# Patient Record
Sex: MALE | Race: WHITE | NOT HISPANIC OR LATINO | URBAN - METROPOLITAN AREA
[De-identification: names, ages, dates, MRNs, and addresses within clinical notes are randomized per-mention and may not be internally consistent; named-entity substitution may affect disease eponyms.]

---

## 2017-11-21 ENCOUNTER — EMERGENCY (EMERGENCY)
Facility: HOSPITAL | Age: 82
LOS: 0 days | Discharge: HOME | End: 2017-11-21
Admitting: INTERNAL MEDICINE

## 2017-11-21 DIAGNOSIS — E78.5 HYPERLIPIDEMIA, UNSPECIFIED: ICD-10-CM

## 2017-11-21 DIAGNOSIS — Z79.01 LONG TERM (CURRENT) USE OF ANTICOAGULANTS: ICD-10-CM

## 2017-11-21 DIAGNOSIS — Z79.899 OTHER LONG TERM (CURRENT) DRUG THERAPY: ICD-10-CM

## 2017-11-21 DIAGNOSIS — I25.10 ATHEROSCLEROTIC HEART DISEASE OF NATIVE CORONARY ARTERY WITHOUT ANGINA PECTORIS: ICD-10-CM

## 2017-11-21 DIAGNOSIS — I12.9 HYPERTENSIVE CHRONIC KIDNEY DISEASE WITH STAGE 1 THROUGH STAGE 4 CHRONIC KIDNEY DISEASE, OR UNSPECIFIED CHRONIC KIDNEY DISEASE: ICD-10-CM

## 2017-11-21 DIAGNOSIS — R79.1 ABNORMAL COAGULATION PROFILE: ICD-10-CM

## 2017-11-21 DIAGNOSIS — I71.4 ABDOMINAL AORTIC ANEURYSM, WITHOUT RUPTURE: ICD-10-CM

## 2017-11-21 DIAGNOSIS — I48.91 UNSPECIFIED ATRIAL FIBRILLATION: ICD-10-CM

## 2017-11-21 DIAGNOSIS — E11.9 TYPE 2 DIABETES MELLITUS WITHOUT COMPLICATIONS: ICD-10-CM

## 2017-11-21 DIAGNOSIS — N18.9 CHRONIC KIDNEY DISEASE, UNSPECIFIED: ICD-10-CM

## 2021-02-08 PROBLEM — Z00.00 ENCOUNTER FOR PREVENTIVE HEALTH EXAMINATION: Status: ACTIVE | Noted: 2021-02-08

## 2021-02-12 ENCOUNTER — NON-APPOINTMENT (OUTPATIENT)
Age: 86
End: 2021-02-12

## 2021-02-12 ENCOUNTER — APPOINTMENT (OUTPATIENT)
Dept: VASCULAR SURGERY | Facility: CLINIC | Age: 86
End: 2021-02-12
Payer: MEDICARE

## 2021-02-12 VITALS
TEMPERATURE: 96.9 F | BODY MASS INDEX: 21.66 KG/M2 | HEART RATE: 84 BPM | DIASTOLIC BLOOD PRESSURE: 69 MMHG | WEIGHT: 138 LBS | HEIGHT: 67 IN | SYSTOLIC BLOOD PRESSURE: 115 MMHG

## 2021-02-12 DIAGNOSIS — Z85.038 PERSONAL HISTORY OF OTHER MALIGNANT NEOPLASM OF LARGE INTESTINE: ICD-10-CM

## 2021-02-12 DIAGNOSIS — Z87.891 PERSONAL HISTORY OF NICOTINE DEPENDENCE: ICD-10-CM

## 2021-02-12 PROCEDURE — 99203 OFFICE O/P NEW LOW 30 MIN: CPT

## 2021-02-12 PROCEDURE — 93978 VASCULAR STUDY: CPT

## 2021-02-12 NOTE — DATA REVIEWED
[FreeTextEntry1] : I performed an arterial duplex which was medically necessary to evaluate for AAA. It showed AAA measuring 4.2 cm.\par

## 2021-02-12 NOTE — HISTORY OF PRESENT ILLNESS
[FreeTextEntry1] : 84 y/o gentleman with h/o LUE DVT in 2011, on Coumadin since then, also has h/o atrial fibrillation. Has h/o AAA that measured 3.5 cm on CT scan in 2017.

## 2021-02-12 NOTE — ASSESSMENT
[FreeTextEntry1] : 86 y/o gentleman with h/o LUE DVT in 2011, on Coumadin since then, also has h/o atrial fibrillation. Has h/o AAA that measured 3.5 cm on CT scan in 2017. \par \par Arterial duplex today showed AAA measuring 4.2 cm. I also reviewed the CT scan from 2017.\par \par I have informed him of the test results and advised follow up in six months time for repeat aortic duplex and lower extremity arterial duplex to evaluate for popliteal artery aneurysms. I have also informed him that he does not require anticoagulation from a vascular perspective for h/o LUE DVT, and should followup with Cardiology as he may be on anticoagulation for atrial fibrillation.

## 2021-03-16 ENCOUNTER — OUTPATIENT (OUTPATIENT)
Dept: OUTPATIENT SERVICES | Facility: HOSPITAL | Age: 86
LOS: 1 days | Discharge: HOME | End: 2021-03-16
Payer: MEDICARE

## 2021-03-16 DIAGNOSIS — R10.9 UNSPECIFIED ABDOMINAL PAIN: ICD-10-CM

## 2021-03-16 PROCEDURE — 76700 US EXAM ABDOM COMPLETE: CPT | Mod: 26

## 2021-04-22 ENCOUNTER — OUTPATIENT (OUTPATIENT)
Dept: OUTPATIENT SERVICES | Facility: HOSPITAL | Age: 86
LOS: 1 days | Discharge: HOME | End: 2021-04-22
Payer: MEDICARE

## 2021-04-22 DIAGNOSIS — D68.9 COAGULATION DEFECT, UNSPECIFIED: ICD-10-CM

## 2021-04-22 PROCEDURE — 74182 MRI ABDOMEN W/CONTRAST: CPT | Mod: 26,MH

## 2021-05-04 ENCOUNTER — EMERGENCY (EMERGENCY)
Facility: HOSPITAL | Age: 86
LOS: 0 days | Discharge: HOME | End: 2021-05-04
Attending: EMERGENCY MEDICINE | Admitting: EMERGENCY MEDICINE
Payer: MEDICARE

## 2021-05-04 VITALS
SYSTOLIC BLOOD PRESSURE: 123 MMHG | OXYGEN SATURATION: 96 % | WEIGHT: 138.01 LBS | HEART RATE: 82 BPM | DIASTOLIC BLOOD PRESSURE: 84 MMHG | TEMPERATURE: 98 F | RESPIRATION RATE: 18 BRPM

## 2021-05-04 VITALS
SYSTOLIC BLOOD PRESSURE: 122 MMHG | OXYGEN SATURATION: 98 % | RESPIRATION RATE: 18 BRPM | DIASTOLIC BLOOD PRESSURE: 59 MMHG | HEART RATE: 78 BPM

## 2021-05-04 DIAGNOSIS — Z90.49 ACQUIRED ABSENCE OF OTHER SPECIFIED PARTS OF DIGESTIVE TRACT: Chronic | ICD-10-CM

## 2021-05-04 DIAGNOSIS — Z95.5 PRESENCE OF CORONARY ANGIOPLASTY IMPLANT AND GRAFT: ICD-10-CM

## 2021-05-04 DIAGNOSIS — E78.5 HYPERLIPIDEMIA, UNSPECIFIED: ICD-10-CM

## 2021-05-04 DIAGNOSIS — Z95.5 PRESENCE OF CORONARY ANGIOPLASTY IMPLANT AND GRAFT: Chronic | ICD-10-CM

## 2021-05-04 DIAGNOSIS — Z87.891 PERSONAL HISTORY OF NICOTINE DEPENDENCE: ICD-10-CM

## 2021-05-04 DIAGNOSIS — Z90.49 ACQUIRED ABSENCE OF OTHER SPECIFIED PARTS OF DIGESTIVE TRACT: ICD-10-CM

## 2021-05-04 DIAGNOSIS — I25.10 ATHEROSCLEROTIC HEART DISEASE OF NATIVE CORONARY ARTERY WITHOUT ANGINA PECTORIS: ICD-10-CM

## 2021-05-04 DIAGNOSIS — R42 DIZZINESS AND GIDDINESS: ICD-10-CM

## 2021-05-04 DIAGNOSIS — R55 SYNCOPE AND COLLAPSE: ICD-10-CM

## 2021-05-04 DIAGNOSIS — I10 ESSENTIAL (PRIMARY) HYPERTENSION: ICD-10-CM

## 2021-05-04 LAB
ALBUMIN SERPL ELPH-MCNC: 4.3 G/DL — SIGNIFICANT CHANGE UP (ref 3.5–5.2)
ALP SERPL-CCNC: 88 U/L — SIGNIFICANT CHANGE UP (ref 30–115)
ALT FLD-CCNC: 9 U/L — SIGNIFICANT CHANGE UP (ref 0–41)
ANION GAP SERPL CALC-SCNC: 11 MMOL/L — SIGNIFICANT CHANGE UP (ref 7–14)
APTT BLD: 23.5 SEC — CRITICAL LOW (ref 27–39.2)
AST SERPL-CCNC: 17 U/L — SIGNIFICANT CHANGE UP (ref 0–41)
BASOPHILS # BLD AUTO: 0.02 K/UL — SIGNIFICANT CHANGE UP (ref 0–0.2)
BASOPHILS NFR BLD AUTO: 0.2 % — SIGNIFICANT CHANGE UP (ref 0–1)
BILIRUB SERPL-MCNC: 0.3 MG/DL — SIGNIFICANT CHANGE UP (ref 0.2–1.2)
BUN SERPL-MCNC: 33 MG/DL — HIGH (ref 10–20)
CALCIUM SERPL-MCNC: 9.5 MG/DL — SIGNIFICANT CHANGE UP (ref 8.5–10.1)
CHLORIDE SERPL-SCNC: 107 MMOL/L — SIGNIFICANT CHANGE UP (ref 98–110)
CO2 SERPL-SCNC: 23 MMOL/L — SIGNIFICANT CHANGE UP (ref 17–32)
CREAT SERPL-MCNC: 2.1 MG/DL — HIGH (ref 0.7–1.5)
EOSINOPHIL # BLD AUTO: 0.02 K/UL — SIGNIFICANT CHANGE UP (ref 0–0.7)
EOSINOPHIL NFR BLD AUTO: 0.2 % — SIGNIFICANT CHANGE UP (ref 0–8)
GLUCOSE SERPL-MCNC: 112 MG/DL — HIGH (ref 70–99)
HCT VFR BLD CALC: 28.9 % — LOW (ref 42–52)
HGB BLD-MCNC: 8.9 G/DL — LOW (ref 14–18)
IMM GRANULOCYTES NFR BLD AUTO: 0.6 % — HIGH (ref 0.1–0.3)
INR BLD: 1.46 RATIO — HIGH (ref 0.65–1.3)
LYMPHOCYTES # BLD AUTO: 0.81 K/UL — LOW (ref 1.2–3.4)
LYMPHOCYTES # BLD AUTO: 9.6 % — LOW (ref 20.5–51.1)
MAGNESIUM SERPL-MCNC: 2.1 MG/DL — SIGNIFICANT CHANGE UP (ref 1.8–2.4)
MCHC RBC-ENTMCNC: 26.2 PG — LOW (ref 27–31)
MCHC RBC-ENTMCNC: 30.8 G/DL — LOW (ref 32–37)
MCV RBC AUTO: 85 FL — SIGNIFICANT CHANGE UP (ref 80–94)
MONOCYTES # BLD AUTO: 0.35 K/UL — SIGNIFICANT CHANGE UP (ref 0.1–0.6)
MONOCYTES NFR BLD AUTO: 4.2 % — SIGNIFICANT CHANGE UP (ref 1.7–9.3)
NEUTROPHILS # BLD AUTO: 7.17 K/UL — HIGH (ref 1.4–6.5)
NEUTROPHILS NFR BLD AUTO: 85.2 % — HIGH (ref 42.2–75.2)
NRBC # BLD: 0 /100 WBCS — SIGNIFICANT CHANGE UP (ref 0–0)
NT-PROBNP SERPL-SCNC: 1098 PG/ML — HIGH (ref 0–300)
PLATELET # BLD AUTO: 195 K/UL — SIGNIFICANT CHANGE UP (ref 130–400)
POTASSIUM SERPL-MCNC: 4.7 MMOL/L — SIGNIFICANT CHANGE UP (ref 3.5–5)
POTASSIUM SERPL-SCNC: 4.7 MMOL/L — SIGNIFICANT CHANGE UP (ref 3.5–5)
PROT SERPL-MCNC: 7.3 G/DL — SIGNIFICANT CHANGE UP (ref 6–8)
PROTHROM AB SERPL-ACNC: 16.8 SEC — HIGH (ref 9.95–12.87)
RBC # BLD: 3.4 M/UL — LOW (ref 4.7–6.1)
RBC # FLD: 15.3 % — HIGH (ref 11.5–14.5)
SODIUM SERPL-SCNC: 141 MMOL/L — SIGNIFICANT CHANGE UP (ref 135–146)
TROPONIN T SERPL-MCNC: <0.01 NG/ML — SIGNIFICANT CHANGE UP
WBC # BLD: 8.42 K/UL — SIGNIFICANT CHANGE UP (ref 4.8–10.8)
WBC # FLD AUTO: 8.42 K/UL — SIGNIFICANT CHANGE UP (ref 4.8–10.8)

## 2021-05-04 PROCEDURE — 99285 EMERGENCY DEPT VISIT HI MDM: CPT

## 2021-05-04 PROCEDURE — 71046 X-RAY EXAM CHEST 2 VIEWS: CPT | Mod: 26

## 2021-05-04 RX ORDER — SODIUM CHLORIDE 9 MG/ML
1000 INJECTION, SOLUTION INTRAVENOUS ONCE
Refills: 0 | Status: COMPLETED | OUTPATIENT
Start: 2021-05-04 | End: 2021-05-04

## 2021-05-04 RX ADMIN — SODIUM CHLORIDE 1000 MILLILITER(S): 9 INJECTION, SOLUTION INTRAVENOUS at 16:17

## 2021-05-04 NOTE — ED ADULT TRIAGE NOTE - CHIEF COMPLAINT QUOTE
near syncope pt states he was shopping with wife and got dizzy, denies LOC, denies falling, states sxs resolved now

## 2021-05-04 NOTE — ED PROVIDER NOTE - ATTENDING CONTRIBUTION TO CARE
84 yo M PMHx noted presents with wife after feeling like he might pass out while shopping with his wife. pt states he felt lightheaded.  no CP, no SOB, no n/v.  Family states that pt does not drink a lot of fluids.  Pt feeling well at this time, On exam pt in NAD AAO x 3, speech is clear and fluent, face symmetrical, Op clear, MMM, Lungs cta b/l, no wrr, abd soft nt nd, no edema, good tone, equal strength, no drift,

## 2021-05-04 NOTE — ED PROVIDER NOTE - OBJECTIVE STATEMENT
86 yo M with PMHx of CAD s/p PCI, HLD, HTN, kidney stones s/p ureteral stents who was BIBEMS for near syncopal episode that occurred prior to arrival. Pt was in his usual state of health and was shopping when he felt dizzy. His wife caught him before he fell. No preceding cp, sob, palpitations, diaphoresis, nasuea prior to fall. No shaking of extremities, tongue biting, incontinence, postictal period. Pt complained of cp yesterday but none today. No fever, chills, cough, sob, nausea, vomiting, abd pain. Pt does not know why he takes coumadin.    Cardio: Dr. Navarro  Nephro: Dr. Josue 84 yo M with PMHx of CAD s/p PCI, HLD, HTN, ureteral stents who was BIBEMS for near syncopal episode that occurred prior to arrival. Pt was in his usual state of health and was shopping when he felt dizzy. His wife caught him before he fell. No preceding cp, sob, palpitations, diaphoresis, nasuea prior to fall. No shaking of extremities, tongue biting, incontinence, postictal period. Pt complained of cp yesterday but none today. No fever, chills, cough, sob, nausea, vomiting, abd pain. Pt does not know why he takes coumadin.    Cardio: Dr. Navarro  Nephro: Dr. Josue

## 2021-05-04 NOTE — ED PROVIDER NOTE - PATIENT PORTAL LINK FT
You can access the FollowMyHealth Patient Portal offered by [facility] by registering at the following website: [email or web address]. By joining [employer]’s FollowMyHealth portal, you will also be able to view your health information using other applications (apps) compatible with our system.

## 2021-05-04 NOTE — ED ADULT NURSE NOTE - CHIEF COMPLAINT QUOTE
pt states he was shopping with wife and got dizzy, denies LOC, denies falling, states sxs resolved now

## 2021-05-04 NOTE — ED PROVIDER NOTE - CARE PROVIDER_API CALL
Popeye Navarro E  Cardiology  [address] [place] [address]  Phone: [phone]  Fax: [phone]  Follow Up Time:

## 2021-05-04 NOTE — ED PROVIDER NOTE - CLINICAL SUMMARY MEDICAL DECISION MAKING FREE TEXT BOX
Pt observed on cardiac monitor.  no ectopy/arrythmia.  Results reviewed and d/w pt.  Hgb noted.  Pt denies any bleeding,  Pt prefers to go home.  Has good follow up and is reliable. Strict return precautions discussed. Pt instructed to return if any worsening symptoms or concerns.  They verbalize understanding.

## 2021-05-04 NOTE — ED PROVIDER NOTE - PROGRESS NOTE DETAILS
TC: 84 yo M with PMHx of CAD s/p PCI, HLD, HTN, kidney stones s/p ureteral stents who presents with near syncopal event. No preceding cardiac sx. No seizure like activity. Currently feels well. Here in ED, vitals wnl. Ordered labs, ekg, cxr. Given IVF. Will reassess. TC: Reassessed pt, states he feels well. Labs notable for hgb 8.9 (unknown baseline, last hgb 12.3 on 11/21/17 but had labs done 2 wks ago), BUN/Cr at baseline. Ekg unremarkable. Cxr negative. Instructed to f/u with cardio. Strict ED return precautions given. Pt and family verbalized understanding and were agreeable with plan. TC: 84 yo M with PMHx of CAD s/p PCI, HLD, HTN, ?ureteral stents who presents with near syncopal event. No preceding cardiac sx. No seizure like activity. Currently feels well. Here in ED, vitals wnl. Ordered labs, ekg, cxr. Given IVF. Will reassess.

## 2021-05-04 NOTE — ED ADULT TRIAGE NOTE - NSWEIGHTCALCTOOLDRUG_GEN_A_CORE
Quality 431: Preventive Care And Screening: Unhealthy Alcohol Use - Screening: Patient screened for unhealthy alcohol use using a single question and scores less than 2 times per year
Quality 130: Documentation Of Current Medications In The Medical Record: Current Medications Documented
Quality 226: Preventive Care And Screening: Tobacco Use: Screening And Cessation Intervention: Patient screened for tobacco use and is an ex/non-smoker
Detail Level: Detailed
Quality 110: Preventive Care And Screening: Influenza Immunization: Influenza immunization was not ordered or administered, reason not given
 used

## 2021-05-04 NOTE — ED PROVIDER NOTE - PMH
Coronary artery disease    Hyperlipidemia    Hypertension    Kidney stone     Coronary artery disease    Hyperlipidemia    Hypertension

## 2021-05-04 NOTE — ED PROVIDER NOTE - NS ED ROS FT
GEN:  no fever, no chills, no generalized weakness  NEURO:  no headache, + dizziness  ENT: no sore throat, no runny nose  CV:  + chest pain yesterday, no palpitations  RESP:  no sob, no cough  GI:  no nausea, no vomiting, no abdominal pain, no diarrhea  :  no dysuria, no urinary frequency, no hematuria  MSK:  no joint pain, no edema  SKIN:  no rash, no bruising  HEME: no hematochezia, no melena

## 2021-07-27 PROBLEM — E78.5 HYPERLIPIDEMIA, UNSPECIFIED: Chronic | Status: ACTIVE | Noted: 2021-05-04

## 2021-07-27 PROBLEM — I25.10 ATHEROSCLEROTIC HEART DISEASE OF NATIVE CORONARY ARTERY WITHOUT ANGINA PECTORIS: Chronic | Status: ACTIVE | Noted: 2021-05-04

## 2021-07-27 PROBLEM — I10 ESSENTIAL (PRIMARY) HYPERTENSION: Chronic | Status: ACTIVE | Noted: 2021-05-04

## 2021-08-18 DIAGNOSIS — I72.4 ANEURYSM OF ARTERY OF LOWER EXTREMITY: ICD-10-CM

## 2021-08-20 ENCOUNTER — APPOINTMENT (OUTPATIENT)
Dept: HEMATOLOGY ONCOLOGY | Facility: CLINIC | Age: 86
End: 2021-08-20

## 2021-08-25 ENCOUNTER — APPOINTMENT (OUTPATIENT)
Dept: VASCULAR SURGERY | Facility: CLINIC | Age: 86
End: 2021-08-25

## 2021-12-08 ENCOUNTER — EMERGENCY (EMERGENCY)
Facility: HOSPITAL | Age: 86
LOS: 0 days | Discharge: HOME | End: 2021-12-08
Admitting: EMERGENCY MEDICINE

## 2021-12-08 ENCOUNTER — INPATIENT (INPATIENT)
Facility: HOSPITAL | Age: 86
LOS: 0 days | Discharge: HOME | End: 2021-12-09
Attending: HOSPITALIST | Admitting: HOSPITALIST
Payer: MEDICARE

## 2021-12-08 VITALS
DIASTOLIC BLOOD PRESSURE: 82 MMHG | RESPIRATION RATE: 20 BRPM | TEMPERATURE: 97 F | HEIGHT: 70 IN | HEART RATE: 64 BPM | SYSTOLIC BLOOD PRESSURE: 175 MMHG | WEIGHT: 125 LBS | OXYGEN SATURATION: 100 %

## 2021-12-08 DIAGNOSIS — I10 ESSENTIAL (PRIMARY) HYPERTENSION: ICD-10-CM

## 2021-12-08 DIAGNOSIS — I25.10 ATHEROSCLEROTIC HEART DISEASE OF NATIVE CORONARY ARTERY WITHOUT ANGINA PECTORIS: ICD-10-CM

## 2021-12-08 DIAGNOSIS — Z90.49 ACQUIRED ABSENCE OF OTHER SPECIFIED PARTS OF DIGESTIVE TRACT: Chronic | ICD-10-CM

## 2021-12-08 DIAGNOSIS — Z79.01 LONG TERM (CURRENT) USE OF ANTICOAGULANTS: ICD-10-CM

## 2021-12-08 DIAGNOSIS — R00.1 BRADYCARDIA, UNSPECIFIED: ICD-10-CM

## 2021-12-08 DIAGNOSIS — N40.0 BENIGN PROSTATIC HYPERPLASIA WITHOUT LOWER URINARY TRACT SYMPTOMS: ICD-10-CM

## 2021-12-08 DIAGNOSIS — I48.20 CHRONIC ATRIAL FIBRILLATION, UNSPECIFIED: ICD-10-CM

## 2021-12-08 DIAGNOSIS — D64.9 ANEMIA, UNSPECIFIED: ICD-10-CM

## 2021-12-08 DIAGNOSIS — I49.5 SICK SINUS SYNDROME: ICD-10-CM

## 2021-12-08 DIAGNOSIS — F32.9 MAJOR DEPRESSIVE DISORDER, SINGLE EPISODE, UNSPECIFIED: ICD-10-CM

## 2021-12-08 DIAGNOSIS — Z95.5 PRESENCE OF CORONARY ANGIOPLASTY IMPLANT AND GRAFT: ICD-10-CM

## 2021-12-08 DIAGNOSIS — Z95.5 PRESENCE OF CORONARY ANGIOPLASTY IMPLANT AND GRAFT: Chronic | ICD-10-CM

## 2021-12-08 DIAGNOSIS — E78.5 HYPERLIPIDEMIA, UNSPECIFIED: ICD-10-CM

## 2021-12-08 DIAGNOSIS — Z20.822 CONTACT WITH AND (SUSPECTED) EXPOSURE TO COVID-19: ICD-10-CM

## 2021-12-08 DIAGNOSIS — Z86.718 PERSONAL HISTORY OF OTHER VENOUS THROMBOSIS AND EMBOLISM: ICD-10-CM

## 2021-12-08 DIAGNOSIS — R00.0 TACHYCARDIA, UNSPECIFIED: ICD-10-CM

## 2021-12-08 DIAGNOSIS — I48.91 UNSPECIFIED ATRIAL FIBRILLATION: ICD-10-CM

## 2021-12-08 LAB
ALBUMIN SERPL ELPH-MCNC: 4 G/DL — SIGNIFICANT CHANGE UP (ref 3.5–5.2)
ALP SERPL-CCNC: 116 U/L — HIGH (ref 30–115)
ALT FLD-CCNC: 12 U/L — SIGNIFICANT CHANGE UP (ref 0–41)
ANION GAP SERPL CALC-SCNC: 19 MMOL/L — HIGH (ref 7–14)
APTT BLD: 32.4 SEC — SIGNIFICANT CHANGE UP (ref 27–39.2)
AST SERPL-CCNC: 17 U/L — SIGNIFICANT CHANGE UP (ref 0–41)
BASOPHILS # BLD AUTO: 0.02 K/UL — SIGNIFICANT CHANGE UP (ref 0–0.2)
BASOPHILS NFR BLD AUTO: 0.4 % — SIGNIFICANT CHANGE UP (ref 0–1)
BILIRUB SERPL-MCNC: 0.5 MG/DL — SIGNIFICANT CHANGE UP (ref 0.2–1.2)
BUN SERPL-MCNC: 27 MG/DL — HIGH (ref 10–20)
CALCIUM SERPL-MCNC: 9.4 MG/DL — SIGNIFICANT CHANGE UP (ref 8.5–10.1)
CHLORIDE SERPL-SCNC: 101 MMOL/L — SIGNIFICANT CHANGE UP (ref 98–110)
CO2 SERPL-SCNC: 19 MMOL/L — SIGNIFICANT CHANGE UP (ref 17–32)
CREAT SERPL-MCNC: 1.4 MG/DL — SIGNIFICANT CHANGE UP (ref 0.7–1.5)
EOSINOPHIL # BLD AUTO: 0 K/UL — SIGNIFICANT CHANGE UP (ref 0–0.7)
EOSINOPHIL NFR BLD AUTO: 0 % — SIGNIFICANT CHANGE UP (ref 0–8)
GLUCOSE SERPL-MCNC: 92 MG/DL — SIGNIFICANT CHANGE UP (ref 70–99)
HCT VFR BLD CALC: 30.5 % — LOW (ref 42–52)
HGB BLD-MCNC: 9.5 G/DL — LOW (ref 14–18)
IMM GRANULOCYTES NFR BLD AUTO: 0.2 % — SIGNIFICANT CHANGE UP (ref 0.1–0.3)
INR BLD: 1.04 RATIO — SIGNIFICANT CHANGE UP (ref 0.65–1.3)
LYMPHOCYTES # BLD AUTO: 0.59 K/UL — LOW (ref 1.2–3.4)
LYMPHOCYTES # BLD AUTO: 10.8 % — LOW (ref 20.5–51.1)
MAGNESIUM SERPL-MCNC: 2.1 MG/DL — SIGNIFICANT CHANGE UP (ref 1.8–2.4)
MCHC RBC-ENTMCNC: 26.2 PG — LOW (ref 27–31)
MCHC RBC-ENTMCNC: 31.1 G/DL — LOW (ref 32–37)
MCV RBC AUTO: 84.3 FL — SIGNIFICANT CHANGE UP (ref 80–94)
MONOCYTES # BLD AUTO: 0.29 K/UL — SIGNIFICANT CHANGE UP (ref 0.1–0.6)
MONOCYTES NFR BLD AUTO: 5.3 % — SIGNIFICANT CHANGE UP (ref 1.7–9.3)
NEUTROPHILS # BLD AUTO: 4.54 K/UL — SIGNIFICANT CHANGE UP (ref 1.4–6.5)
NEUTROPHILS NFR BLD AUTO: 83.3 % — HIGH (ref 42.2–75.2)
NRBC # BLD: 0 /100 WBCS — SIGNIFICANT CHANGE UP (ref 0–0)
PLATELET # BLD AUTO: 173 K/UL — SIGNIFICANT CHANGE UP (ref 130–400)
POTASSIUM SERPL-MCNC: 4.4 MMOL/L — SIGNIFICANT CHANGE UP (ref 3.5–5)
POTASSIUM SERPL-SCNC: 4.4 MMOL/L — SIGNIFICANT CHANGE UP (ref 3.5–5)
PROT SERPL-MCNC: 6.8 G/DL — SIGNIFICANT CHANGE UP (ref 6–8)
PROTHROM AB SERPL-ACNC: 11.9 SEC — SIGNIFICANT CHANGE UP (ref 9.95–12.87)
RBC # BLD: 3.62 M/UL — LOW (ref 4.7–6.1)
RBC # FLD: 24.5 % — HIGH (ref 11.5–14.5)
SARS-COV-2 RNA SPEC QL NAA+PROBE: SIGNIFICANT CHANGE UP
SODIUM SERPL-SCNC: 139 MMOL/L — SIGNIFICANT CHANGE UP (ref 135–146)
TROPONIN T SERPL-MCNC: <0.01 NG/ML — SIGNIFICANT CHANGE UP
WBC # BLD: 5.45 K/UL — SIGNIFICANT CHANGE UP (ref 4.8–10.8)
WBC # FLD AUTO: 5.45 K/UL — SIGNIFICANT CHANGE UP (ref 4.8–10.8)

## 2021-12-08 PROCEDURE — 93308 TTE F-UP OR LMTD: CPT | Mod: 26

## 2021-12-08 PROCEDURE — 93010 ELECTROCARDIOGRAM REPORT: CPT

## 2021-12-08 PROCEDURE — 99285 EMERGENCY DEPT VISIT HI MDM: CPT | Mod: 25

## 2021-12-08 PROCEDURE — 99223 1ST HOSP IP/OBS HIGH 75: CPT | Mod: 57

## 2021-12-08 PROCEDURE — 99223 1ST HOSP IP/OBS HIGH 75: CPT

## 2021-12-08 RX ORDER — MIRTAZAPINE 45 MG/1
0 TABLET, ORALLY DISINTEGRATING ORAL
Qty: 0 | Refills: 0 | DISCHARGE

## 2021-12-08 RX ORDER — LANOLIN ALCOHOL/MO/W.PET/CERES
3 CREAM (GRAM) TOPICAL AT BEDTIME
Refills: 0 | Status: DISCONTINUED | OUTPATIENT
Start: 2021-12-08 | End: 2021-12-09

## 2021-12-08 RX ORDER — SODIUM CHLORIDE 9 MG/ML
1000 INJECTION INTRAMUSCULAR; INTRAVENOUS; SUBCUTANEOUS ONCE
Refills: 0 | Status: COMPLETED | OUTPATIENT
Start: 2021-12-08 | End: 2021-12-08

## 2021-12-08 RX ORDER — TAMSULOSIN HYDROCHLORIDE 0.4 MG/1
0 CAPSULE ORAL
Qty: 0 | Refills: 4 | DISCHARGE

## 2021-12-08 RX ORDER — ASPIRIN/CALCIUM CARB/MAGNESIUM 324 MG
81 TABLET ORAL DAILY
Refills: 0 | Status: DISCONTINUED | OUTPATIENT
Start: 2021-12-08 | End: 2021-12-09

## 2021-12-08 RX ORDER — TAMSULOSIN HYDROCHLORIDE 0.4 MG/1
0.4 CAPSULE ORAL AT BEDTIME
Refills: 0 | Status: DISCONTINUED | OUTPATIENT
Start: 2021-12-08 | End: 2021-12-09

## 2021-12-08 RX ORDER — RIVAROXABAN 15 MG-20MG
15 KIT ORAL
Refills: 0 | Status: DISCONTINUED | OUTPATIENT
Start: 2021-12-08 | End: 2021-12-09

## 2021-12-08 RX ORDER — ACETAMINOPHEN 500 MG
650 TABLET ORAL EVERY 6 HOURS
Refills: 0 | Status: DISCONTINUED | OUTPATIENT
Start: 2021-12-08 | End: 2021-12-09

## 2021-12-08 RX ORDER — SODIUM BICARBONATE 1 MEQ/ML
0 SYRINGE (ML) INTRAVENOUS
Qty: 0 | Refills: 3 | DISCHARGE

## 2021-12-08 RX ORDER — RIVAROXABAN 15 MG-20MG
0 KIT ORAL
Qty: 0 | Refills: 1 | DISCHARGE

## 2021-12-08 RX ORDER — ONDANSETRON 8 MG/1
4 TABLET, FILM COATED ORAL EVERY 8 HOURS
Refills: 0 | Status: DISCONTINUED | OUTPATIENT
Start: 2021-12-08 | End: 2021-12-09

## 2021-12-08 RX ORDER — ATORVASTATIN CALCIUM 80 MG/1
0 TABLET, FILM COATED ORAL
Qty: 0 | Refills: 0 | DISCHARGE

## 2021-12-08 RX ORDER — MIRTAZAPINE 45 MG/1
15 TABLET, ORALLY DISINTEGRATING ORAL DAILY
Refills: 0 | Status: DISCONTINUED | OUTPATIENT
Start: 2021-12-08 | End: 2021-12-09

## 2021-12-08 RX ORDER — ATORVASTATIN CALCIUM 80 MG/1
40 TABLET, FILM COATED ORAL AT BEDTIME
Refills: 0 | Status: DISCONTINUED | OUTPATIENT
Start: 2021-12-08 | End: 2021-12-09

## 2021-12-08 RX ADMIN — TAMSULOSIN HYDROCHLORIDE 0.4 MILLIGRAM(S): 0.4 CAPSULE ORAL at 22:06

## 2021-12-08 RX ADMIN — SODIUM CHLORIDE 1000 MILLILITER(S): 9 INJECTION INTRAMUSCULAR; INTRAVENOUS; SUBCUTANEOUS at 13:19

## 2021-12-08 RX ADMIN — RIVAROXABAN 15 MILLIGRAM(S): KIT at 22:06

## 2021-12-08 RX ADMIN — SODIUM CHLORIDE 1000 MILLILITER(S): 9 INJECTION INTRAMUSCULAR; INTRAVENOUS; SUBCUTANEOUS at 14:56

## 2021-12-08 RX ADMIN — ATORVASTATIN CALCIUM 40 MILLIGRAM(S): 80 TABLET, FILM COATED ORAL at 22:06

## 2021-12-08 NOTE — H&P ADULT - NSHPLABSRESULTS_GEN_ALL_CORE
9.5    5.45  )-----------( 173      ( 08 Dec 2021 13:17 )             30.5       12-08    139  |  101  |  27<H>  ----------------------------<  92  4.4   |  19  |  1.4    Ca    9.4      08 Dec 2021 13:17  Mg     2.1     12-08    TPro  6.8  /  Alb  4.0  /  TBili  0.5  /  DBili  x   /  AST  17  /  ALT  12  /  AlkPhos  116<H>  12-08                  PT/INR - ( 08 Dec 2021 13:17 )   PT: 11.90 sec;   INR: 1.04 ratio         PTT - ( 08 Dec 2021 13:17 )  PTT:32.4 sec    CARDIAC MARKERS ( 08 Dec 2021 13:17 )  x     / <0.01 ng/mL / x     / x     / x            CAPILLARY BLOOD GLUCOSE

## 2021-12-08 NOTE — H&P ADULT - NSHPPHYSICALEXAM_GEN_ALL_CORE
GENERAL: NAD, lying in bed comfortably  HEAD:  Atraumatic, Normocephalic  CHEST/LUNG: Clear to auscultation bilaterally  HEART: Regular rate and rhythm  ABDOMEN: Bowel sounds present; Soft, Nontender, Nondistended  EXTREMITIES: No Peripheral edema  NERVOUS SYSTEM:  Alert & Oriented X3, speech clear. No deficits   MSK: FROM all 4 extremities, full and equal strength  SKIN: No rashes or lesions

## 2021-12-08 NOTE — ED PROVIDER NOTE - ATTENDING CONTRIBUTION TO CARE
87 yo male  PMH, colon ca in remission, anemia requiring iron infusions, CAD, HTN, HLD brought from endo suite after he developed tachy-italo during an endoscopic procedure.  Patient appears very well, denies any complaints, VS WNL.   Well-appearing well-nourished elderly male in NAD, head AT/NC, PERRL, pink conjunctivae,  mmm,  nml work of breathing, lungs CTA b/l, equal air entry, RRR, well-perfused extremities, , abdomen soft, NT/ND, BS present in all quadrants, no midline spine or CVA ttp, no leg edema or unilateral calf swelling, A&Ox3, no focal neuro deficits, nml mood and affect.  labs sent, patient was evaluated by EP service, advised admission to tele for monitoring.  Patient is amenable with the plan.

## 2021-12-08 NOTE — ED ADULT NURSE NOTE - NSIMPLEMENTINTERV_GEN_ALL_ED
Implemented All Fall with Harm Risk Interventions:  [place] to call system. Call bell, personal items and telephone within reach. Instruct patient to call for assistance. Room bathroom lighting operational. Non-slip footwear when patient is off stretcher. Physically safe environment: no spills, clutter or unnecessary equipment. Stretcher in lowest position, wheels locked, appropriate side rails in place. Provide visual cue, wrist band, yellow gown, etc. Monitor gait and stability. Monitor for mental status changes and reorient to person, place, and time. Review medications for side effects contributing to fall risk. Reinforce activity limits and safety measures with patient and family. Provide visual clues: red socks.

## 2021-12-08 NOTE — ED ADULT TRIAGE NOTE - CHIEF COMPLAINT QUOTE
Pt was having colonoscopy/endoscopy, began having irregular HR and rhythm sent in for eval, hx of A fib, pt asymptomatic

## 2021-12-08 NOTE — ED ADULT NURSE NOTE - OBJECTIVE STATEMENT
Pt presented to ED c/o irregular heartbeat. As per pt family, pt s/p colonoscopy/endoscopy today, pt noted to be bradycardic after procedure. Pt denies symptoms now, denies n/v/d/fevers/chills. Pt A&Ox4, ambulatory baseline, placed on Tele/O2 monitor.

## 2021-12-08 NOTE — ED PROVIDER NOTE - PROGRESS NOTE DETAILS
dc: plan to restart xarelto after 24 hr monitoring in preparation for possible PPM placement as per EP team.

## 2021-12-08 NOTE — H&P ADULT - ASSESSMENT
86 year old Male with Past Medical History of CAD s/p PCI, HLD, HTN, ureteral stents, Chronic Afib on Xarelto presented with bradycardia and tachycardia episodes during routine Colonoscopy.  86 year old Male with Past Medical History of CAD s/p PCI, HLD, HTN, ?colon cancer s/p resection, ureteral stents, Chronic Afib on Xarelto presented with bradycardia and tachycardia episodes during routine Colonoscopy.     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 Would follow with EP for further plan of care.    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  # H/O Chronic Afib on Xarelto  # HO Upper extremity DVT (2011)  - Italo into the 40s and tachy to 150s post anesthesia  - No palpitations or any other symptoms reported as per patient  - Will monitor on tele  - NPO after midnight for possible PPM  - Will continue Xarelto  - Follow up with EP    # Depression  - Continue Mirtazepine 15 daily    # HLD  - Continue Statin    # CAD s/p PCI  - Continue Statin, Aspirin, not on Beta blocker    # BPH  - Continue Tamsulosin daily    DVT: Xarelto  GI: Protonix  Dispo: Pending EP    Med rec with pharmacy and patient 86 year old Male with Past Medical History of CAD s/p PCI, HLD, HTN, ?colon cancer s/p resection, ureteral stents, Chronic Afib on Xarelto presented with bradycardia and tachycardia episodes during routine Colonoscopy.     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 Would follow with EP for further plan of care.    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 Please verify with granddaughter as patient not 100% sure of his medications

## 2021-12-08 NOTE — ED PROVIDER NOTE - NSICDXPASTSURGICALHX_GEN_ALL_CORE_FT
PAST SURGICAL HISTORY:  History of appendectomy     S/P coronary artery stent placement     Status post colectomy

## 2021-12-08 NOTE — CONSULT NOTE ADULT - ATTENDING COMMENTS
complex patient   Bradycardia in recovery post-colonoscopy, followed by tachycardia  Tracings not available  possible tachy-italo  in ER in sinus 80 bpm  monitor on Tely, if Bd will place ppm  if Tely negative and no symptoms; would recommend [facility]OT 30 days

## 2021-12-08 NOTE — CONSULT NOTE ADULT - SUBJECTIVE AND OBJECTIVE BOX
HISTORY OF PRESENT ILLNESS:   85yo M hx of Afib on xarelto, CAD, HTN, HLD presented with cc of bradycardia and tachycardia during Colonoscopy. Patient was scheduled for colonoscopy at ambulatory office earlier today. Noted bradycardia to 40s and tachycardia to ~150 during the perioperative period after anesthesia is given. Patient tolerated procedure. Denied     PAST MEDICAL & SURGICAL HISTORY  Hyperlipidemia    Hypertension    Coronary artery disease    S/P coronary artery stent placement    History of appendectomy    Status post colectomy        FAMILY HISTORY:  FAMILY HISTORY:      SOCIAL HISTORY:  []smoker  []Alcohol  []Drug    ROS:  Negative except as mentioned in HPI    ALLERGIES:  No Known Allergies      MEDICATIONS:  MEDICATIONS  (STANDING):    MEDICATIONS  (PRN):      HOME MEDICATIONS:  Home Medications:      VITALS:   T(F): 97.4 (12-08 @ 12:09), Max: 97.4 (12-08 @ 12:09)  HR: 64 (12-08 @ 12:09) (64 - 64)  BP: 175/82 (12-08 @ 12:09) (175/82 - 175/82)  BP(mean): --  RR: 20 (12-08 @ 12:09) (20 - 20)  SpO2: 100% (12-08 @ 12:09) (100% - 100%)    I&O's Summary      PHYSICAL EXAM:  GEN: Not in acute distress  HEENT: NCAT, PERRL, EOMI  LUNGS: Clear to auscultation bilaterally   CARDIOVASCULAR: RRR, S1/S2 present, no murmurs, rubs or gallops, no JVD, + PP bilaterally  ABD: Soft, non-tender, non-distended  EXT: No KANDACE  SKIN: Intact  NEURO: AAOx3    LABS:                    Hemoglobin A1C   Thyroid      -CXR:  -TTE:  -CCTA:  -STRESS TEST:  -CATHETERIZATION:  -ECG:   -Telemetry:   HISTORY OF PRESENT ILLNESS:   85yo M hx of Afib on xarelto, CAD, HTN, HLD presented with cc of bradycardia and tachycardia during Colonoscopy. Patient was scheduled for colonoscopy at ambulatory office earlier today. Noted bradycardia to 40s and tachycardia to ~150 during the perioperative period after anesthesia is given. Patient tolerated procedure. Patient otherwise denied active chest pain, sob or palpitations.    PAST MEDICAL & SURGICAL HISTORY  Hyperlipidemia    Hypertension    Coronary artery disease    S/P coronary artery stent placement    History of appendectomy    Status post colectomy        FAMILY HISTORY:  FAMILY HISTORY:      SOCIAL HISTORY:  []smoker  []Alcohol  []Drug    ROS:  Negative except as mentioned in HPI    ALLERGIES:  No Known Allergies      MEDICATIONS:  MEDICATIONS  (STANDING):    MEDICATIONS  (PRN):      HOME MEDICATIONS:  Home Medications:      VITALS:   T(F): 97.4 (12-08 @ 12:09), Max: 97.4 (12-08 @ 12:09)  HR: 64 (12-08 @ 12:09) (64 - 64)  BP: 175/82 (12-08 @ 12:09) (175/82 - 175/82)  BP(mean): --  RR: 20 (12-08 @ 12:09) (20 - 20)  SpO2: 100% (12-08 @ 12:09) (100% - 100%)    I&O's Summary      PHYSICAL EXAM:  GEN: Not in acute distress  HEENT: NCAT, PERRL, EOMI  LUNGS: Clear to auscultation bilaterally   CARDIOVASCULAR: RRR, S1/S2 present, no murmurs, rubs or gallops, no JVD, + PP bilaterally  ABD: Soft, non-tender, non-distended  EXT: No KANDACE  SKIN: Intact  NEURO: AAOx3    LABS:                    Hemoglobin A1C   Thyroid    EKG: sinus HR 80s, PACs   HISTORY OF PRESENT ILLNESS:   85yo M hx of Afib on xarelto, CAD, HTN, HLD presented with cc of bradycardia and tachycardia during Colonoscopy. Patient was scheduled for colonoscopy at ambulatory office earlier today. Noted bradycardia to 40s and tachycardia to ~150 during the perioperative period after anesthesia is given. Patient tolerated procedure. Patient otherwise denied active chest pain, sob or palpitations.    PAST MEDICAL & SURGICAL HISTORY  Hyperlipidemia    Hypertension    Coronary artery disease    S/P coronary artery stent placement    History of appendectomy    Status post colectomy        FAMILY HISTORY:  FAMILY HISTORY: no premature CAD      SOCIAL HISTORY:  []smoker none   []Alcohol none  []Drug none    ROS:  Negative except as mentioned in HPI    ALLERGIES:  No Known Allergies      MEDICATIONS:  MEDICATIONS  (STANDING):    MEDICATIONS  (PRN):      HOME MEDICATIONS:  Home Medications:      VITALS:   T(F): 97.4 (12-08 @ 12:09), Max: 97.4 (12-08 @ 12:09)  HR: 64 (12-08 @ 12:09) (64 - 64)  BP: 175/82 (12-08 @ 12:09) (175/82 - 175/82)  BP(mean): --  RR: 20 (12-08 @ 12:09) (20 - 20)  SpO2: 100% (12-08 @ 12:09) (100% - 100%)    I&O's Summary      PHYSICAL EXAM:  GEN: Not in acute distress  HEENT: NCAT, PERRL, EOMI  LUNGS: Clear to auscultation bilaterally   CARDIOVASCULAR: RRR, S1/S2 present, no murmurs, rubs or gallops, no JVD, + PP bilaterally  ABD: Soft, non-tender, non-distended  EXT: No KANDACE  SKIN: Intact  NEURO: AAOx3    LABS:                    Hemoglobin A1C   Thyroid    EKG: sinus HR 80s, PACs

## 2021-12-08 NOTE — H&P ADULT - HISTORY OF PRESENT ILLNESS
86 year old Male with Past Medical History of CAD s/p PCI, HLD, HTN, ureteral stents, Chronic Afib on Xarelto presented with bradycardia and tachycardia episodes during routine Colonoscopy.     Patient had a colonoscopy at ambulatory office earlier today and was noted to have bradycardia to 40s and tachycardia to 150s during the perioperative period after anesthesia. Patient did not have any complications during the procedure. Reports no chest pain, abdominal pain, palpitations, nausea, vomiting, diarrhea, constipation, dysuria or any other complaints.    In ED patient hemodynamically stable, afebrile, EP consulted and recommended monitoring on tele for possible PPM in case any new event happens. 86 year old Male with Past Medical History of CAD s/p PCI, HLD, HTN, ?colon cancer s/p resection, ureteral stents, Chronic Afib on Xarelto presented with bradycardia and tachycardia episodes during routine Colonoscopy.     Patient had a colonoscopy at ambulatory office earlier today and was noted to have bradycardia to 40s and tachycardia to 150s during the perioperative period after anesthesia. Patient did not have any complications during the procedure. Reports no chest pain, abdominal pain, palpitations, nausea, vomiting, diarrhea, constipation, dysuria or any other complaints.    In ED patient hemodynamically stable, afebrile, EP consulted and recommended monitoring on tele for possible PPM in case any new event happens.

## 2021-12-08 NOTE — H&P ADULT - ATTENDING COMMENTS
PHYSICAL EXAM:  GENERAL: lying in bed comfortably  HEAD:  Atraumatic, Normocephalic  EYES: conjunctiva and sclera clear  ENT: Moist mucous membranes  NECK: Supple, No JVD  CHEST/LUNG: Clear to auscultation bilaterally  HEART: No murmurs, rubs, or gallops  ABDOMEN: Soft, Nontender, Nondistended. No hepatomegally  EXTREMITIES:  2+ Peripheral Pulses, brisk capillary refill. No clubbing, cyanosis, or edema  NERVOUS SYSTEM:  Alert & Oriented X3, speech clear.  MSK: FROM all 4 extremities, full and equal strength  SKIN: No rashes or lesions    86 year old Male with Past Medical History of CAD s/p PCI, HLD, HTN, ?colon cancer s/p resection, ureteral stents, Chronic Afib on Xarelto presented with bradycardia and tachycardia episodes during routine Colonoscopy. 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  # H/O Chronic Afib on Xarelto  # HO Upper extremity DVT (2011)  - Italo into the 40s and tachy to 150s post anesthesia  - No palpitations or any other symptoms reported as per patient  - Will monitor on tele  - NPO after midnight for possible PPM  - Will continue Xarelto  - Follow up with EP  # Depression Continue Mirtazepine 15 daily  # HLD Continue Statin  # CAD s/p PCI Continue Statin, Aspirin, not on Beta blocker  # BPHContinue Tamsulosin daily  # Normocytic Anemia Monitor Hgb  #Progress Note Handoff  Pending (specify):  tele monitoring EPS follow up , possible PPM  Disposition: To be determined PHYSICAL EXAM:  GENERAL: lying in bed comfortably  HEAD:  Atraumatic, Normocephalic  EYES: conjunctiva and sclera clear  ENT: Moist mucous membranes  NECK: Supple, No JVD  CHEST/LUNG: Clear to auscultation bilaterally  HEART: No murmurs, rubs, or gallops  ABDOMEN: Soft, Nontender, Nondistended. No hepatomegally  EXTREMITIES:  2+ Peripheral Pulses, brisk capillary refill. No clubbing, cyanosis, or edema  NERVOUS SYSTEM:  Alert & Oriented X3, speech clear.  MSK: FROM all 4 extremities, full and equal strength  SKIN: No rashes or lesions    86 year old Male with Past Medical History of CAD s/p PCI, HLD, HTN, ?colon cancer s/p resection, ureteral stents, Chronic Afib on Xarelto presented with bradycardia and tachycardia episodes during routine Colonoscopy. 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 monitor   #Progress Note Handoff  Pending (specify):  tele monitoring EPS follow up , possible PPM  Disposition: To be determined

## 2021-12-08 NOTE — ED PROVIDER NOTE - CLINICAL SUMMARY MEDICAL DECISION MAKING FREE TEXT BOX
85 yo male  PMH, colon ca in remission, anemia requiring iron infusions, CAD, HTN, HLD brought from endo suite after he developed tachy-italo during an endoscopic procedure.  Patient appears very well, denies any complaints, VS WNL.   Well-appearing well-nourished elderly male in NAD, head AT/NC, PERRL, pink conjunctivae,  mmm,  nml work of breathing, lungs CTA b/l, equal air entry, RRR, well-perfused extremities, , abdomen soft, NT/ND, BS present in all quadrants, no midline spine or CVA ttp, no leg edema or unilateral calf swelling, A&Ox3, no focal neuro deficits, nml mood and affect.  labs sent, patient was evaluated by EP service, advised admission to tele for monitoring.  Patient is amenable with the plan.

## 2021-12-08 NOTE — CONSULT NOTE ADULT - ASSESSMENT
87yo M hx of Afib on xarelto, CAD, HTN, HLD presented with cc of bradycardia and tachycardia during Colonoscopy.    - Will monitor patient on tele until tomorrow morning.  - Keep NPO after MN.  - If no events in the next 24h, will discharge with [facility] for monitoring outpatient.  - If recurrence of tele events, will plan for PPM in the am.  - Plan discussed with EP attending.

## 2021-12-08 NOTE — ED ADULT TRIAGE NOTE - NS ED NURSE BANDS TYPE
Name band;
[FreeTextEntry1] : \par  Pathology             Final\par \par No Documents Attached\par \par \par \par \par   [facility] Accession Number : [id number]\par \par JANES MOTA                          3\par \par \par \par Hematopathology Report\par \par \par \par \par Final Diagnosis\par 1, 2. Bone marrow biopsy and bone marrow aspirate\par - Trilineage hematopoiesis with maturation and iron stores\par present\par - Monotypic plasma cells present\par - Negative for Congo red stains\par \par See note and description.\par \par Diagnostic note:\par Monotypic plasma cells are present (plasma cell count: < 10% by\par  stain, 0.9% by flow cytometry,  9% by smear count).\par Correlation with relevant clinical, laboratory and radiologic\par imaging is suggested. Comprehensive report with results of\par pending ancillary studies to follow.\par \par Dr. ISAIAH Suazo was notified of the diagnosis on 07/31/19\par 13:43.\par \par Ancillary studies\par Bone marrow aspirate iron stain: Iron stores are present; no ring\par sideroblasts are seen.\par Congo red stains (block 1A and 1B) are negative for amyloidosis.\par \par Flow cytometry:  Monotypic plasma cells (0.9% of cells), positive\par for cytoplasmic kappa, CD38, , dimmer CD45, ; negative\par CD19, CD20, CD56.\par Lymphocytes (19% of cells): Heterogeneous population of T-cells\par (with normal CD4 to CD8 ratio, including few natural killer-like\par T-cells), natural killer cells, and polytypic B-cells.\par Hematogones are present.\par \par Immunohistochemical stains:   stains (blocks 1A and 1B) show\par approximately 5 to 9% plasma cells with cyclin-D1 positivity. In-\par situ hybridization studies (block 1A) for cytoplasmic kappa and\par lambda show kappa staining restriction.\par \par Microscopic description:\par 1. Biopsy: Sections of bone marrow biopsy show normocellularity\par (50 to 55% cellularity), trilineage hematopoiesis with\par maturation, megakaryocytes normal in number and morphology, mild\par plasmacytosis and iron stores present.\par \par 2. Aspirate: Cellular spicules are present, adequate for\par interpretation.  Maturing and mature myeloid and erythroid\par elements are present with normal M:E ratio.  Megakaryocytes\par appear normal in number and morphology.\par \par \par \par \par \par \par JANES MOTA                          3\par \par \par \par Hematopathology Report\par \par \par \par \par Bone Marrow Aspirate Differential: (200 Cells).\par Type            %    Normal*\par Blast                1%   0-3\par Neutrophil and\par Precursors        52%  47-74\par Eosinophil           4%   1-3\par Basophil        1%   0-1\par Pronormoblast        2%   0-2\par Normoblast           19%  15-25\par Monocyte        2%   0-1\par Lymphocyte           10%  5-15\par Plasma cell          9%   0-2\par *Adult Range\par \par Comment:  Iron stain (examined to evaluate for iron stores; see\par microscopic description) and Giemsa stain (shows appropriate\par staining pattern) are performed and evaluated on block(s): 1A,\par 1B.\par \par Built in immunohistochemical study control(s) associated with\par this case have been verified by the sign out pathologist.\par These immunohistochemical/ in-situ hybridization tests have been\par developed and their performance characteristics determined by\par [facility] / [facility], Department of\par Pathology, Division of Immunopathology [facility]\par Center, [address] [place] [address].  It has not\par been cleared or approved by the U.S. Food and Drug\par Administration.  The FDA has determined that such clearance or\par approval is not necessary.  This test is used for clinical\par purposes.  The laboratory is certified under the\par CLIA-88 as qualified to perform high complexity clinical testing.\par \par Verified by: Jaime Ramey M.D.\par (Electronic Signature)\par Reported on: 07/31/19 13:49 EDT, [address] [place] [address]\par [id number]\par _________________________________________________________________\par \par Clinical History\par Rule out MM, amyloid congo red please\par CBC on 7/22/2019: WBC: 7.5 K/uL, HGB: 13.6 g/dL, MCV: 91.0 fl,\par Plt: 254 K/uL\par SPEP showed M-spike 0.5 g/dL, immunofixation of serum showed: IgA\par kappa\par \par Specimen(s) Submitted\par 1     Bone marrow biopsy\par 2     Bone marrow aspirate\par \par \par \par \par \par NAKUL JANES                          3\par \par \par \par Hematopathology Report\par \par \par \par \par \par Gross Description\par 1. The specimen is received in bouin's fixative and the specimen\par container is labeled: Right PIC bone marrow biopsy.  It consists\par of a 1.1 cm in length x 0.2 cm in diameter bone marrow core with\par a 3.0 x 2.8 x 0.4 cm aggregate of blood clot. Special stains are\par requested. Entirely submitted.  Two cassettes: 1A = bone marrow\par core; 1B = blood clot.\par \par 2. Two Lind-Giemsa stained bone marrow aspirate smears are\par submitted [10-FL-, ].\par \par In addition to other data that may appear on the specimen\par container, the label has been inspected to confirm the presence\par of the patient's name and date of birth.\par \par Sandrine Valencia 07/24/2019 16:09\par \par  \par \par  Ordered by: AME SUAZO       Collected/Examined: [id number] 10:45AM       \par Verified by: AME SUAZO [id number] 04:59PM       \par  Result Communication: No patient communication needed at this time;\par Stage: Final       \par  Performed at: [facility]       Resulted: [id number] 01:49PM       Last Updated: 31Jul2019 04:59PM       Accession: [id number]      \par \par \par  Pathology             Final\par \par No Documents Attached\par \par \par \par \par   Jasbir Accession Number : [id number]\par \par JANES MOTA 2\par \par \par \par Cytopathology Report\par \par \par \par \par \par Final Diagnosis\par BONE, VERTEBRAL BODY, S3, CT GUIDED CORE BIOPSY AND FNA\par Cellular marrow with maturing trilineage hematopoiesis and mild\par plasmacytosis\par See note and description.\par \par Diagnostic note:  Per chart review, patient has lytic lesions in\par vertebral body S3. The current biopsy consists of fragmented\par marrow showing cellular marrow with maturing trilineage\par hematopoiesis and mild plasmacytosis. The clonality of the plasma\par cell population cannot be determined by kappaISH and lambdaISH\par studies. Immunohistochemistry work up for light chain restriction\par are in proces and will be reported as an addendum. Correlation\par with clinical findings (e.g.: serum immunoglobulin levels, SPEP,\par IF studies) and if clinically indicated a bone marrow biopsy with\par flow cytometry and cytogenetic analysis is recommended.\par \par Microscopic description:   Cytology slides, touch preps, cell\par block and histologic sections of core biopsy consists of small\par fragments of marrow elements with 40-50% cellularity showing\par maturing trilineage hematopoiesis with mild plasmacytosis. Plasma\par cells are mostly distributed perivascularly and some\par interstitially, occasional small groups are seen.\par \par Immunohistochemistry:  CD20, CD3, CD34, , AE1-3, ,\par KappaISH, lambdaISH, kappa and lambda, MPO, CD71, E-cad, CD15\par stains  performed on formalin fixed paraffin embedded tissue,\par block 1B.\par \par CD34: Highlights vascular structures, no increase in blast\par population\par : Highlights erythroid series and scattered masts cells. No\par increase in blast population\par CD20: Highlights small B cells scattered interstitially\par CD3: Highlights small T cells scattered interstitially\par AE1-3: Negative\par \par  stain highlights plasma cells forming small clusters,\par comprising less than 5% marrow cellularity. KappaISH and\par lambdaISH stains are non-contributory.\par \par Kappa and lambda light chain immunostains are in process and will\par be reported as an addendum.\par \par MPO, CD71, E-cad and CD15 immunostains are in process and will be\par reported as an addendum.\par \par \par \par \par \par JANES MOTA                          2\par \par \par \par Cytopathology Report\par \par \par \par \par \par Flow cytometry analysis:  Not submitted\par \par Screened by: Christina Urena CT (ASCP)\par Verified by: Vicki Prajapati MD\par (Electronic Signature)\par Reported on: 07/10/19 12:55 EDT, [address] [facility], [place], [place]\par [address]\par Cytology technical processing performed at [address] [place] [address]\par _________________________________________________________________\par \par \par Specimen(s) Submitted\par BONE, VERTEBRAL BODY, S3, CT GUIDED CORE BIOPSY AND FNA\par \par \par Clinical Information\par Spinal cord lesion. Rule out cancer. No history of cancer. Rule\par out cordoma.\par \par CT Chest, abdomen and pelvis (06/08/2019): Lytic lesions of S3\par vertebral body, lucency in posterior T 1,  anterior T7, and\par anterior T2 vertebral bodies are indeterminate, possible\par representing small hemangiomas versus additional lytic lesions\par \par \par Gross Description\par Core Biopsy:\par Tissue collected: Specimen container has been inspected to\par confirm patient?s name and date of birth, contains multiple tan\par cores measuring 0.3 - 1.0 cm in length (and multiple fragments).\par Entire specimen submitted in 2 cassette(s) labeled 1B and 1C.\par \par 1 Touch prep slides ( 1 air dried + 0 fixed)\par \par FNA:\par Received: 4 air dried and 4 fixed slides\par Needle rinses in 20 ml formalin\par Submitted: cell block in one cassette labeled 1A\par \par Prepared:\par 1 Touch Prep, 4 Diff Quick,  4 Pap Stained slides\par 1 cell block  labeled 1A\par 1 core biopsy labeled 1B\par 1 core biopsy labeled 1C\par 12 Total slides\par \par  \par \par  Ordered by: DOCUMENT, FRANKLIN       Collected/Examined: 02Jul2019 03:11PM       \par Verification Not Required       Stage: Final       \par  Performed at: [facility]       Resulted: 10Jul2019 12:55PM       Last Updated: 10Jul2019 12:56PM       Accession: [id number]        \par \par \par  Pathology             Final\par \par No Documents Attached\par \par \par \par \par   [facility] Accession Number : [id number]\par \par JANES MOTA                          3\par \par \par \par Cytopathology Addendum Report\par \par \par \par \par Addendum\par For reporting additional immunostain results\par \par Immunohistochemistry:\par Kappa and lambda light chain stains looks polyclonal in this\par biopsy with high background staining.\par CD71: Highlights erythroid series\par E-cad: Negative\par MPO: Highlights myeloid series\par CD15: Highlights maturing myeloid series\par \par The diagnosis remains unchanged.\par \par Verified by: Vicki Prajapati MD\par (Electronic Signature)\par Reported on: 07/15/19 15:10 EDT, [address] [facility], [place], [place]\par [address]\par _________________________________________________________________\par \par \par Cytopathology Report\par \par \par \par \par \par Final Diagnosis\par BONE, VERTEBRAL BODY, S3, CT GUIDED CORE BIOPSY AND FNA\par Cellular marrow with maturing trilineage hematopoiesis and mild\par plasmacytosis\par See note and description.\par \par Diagnostic note:  Per chart review, patient has lytic lesions in\par vertebral body S3. The current biopsy consists of fragmented\par marrow showing cellular marrow with maturing trilineage\par hematopoiesis and mild plasmacytosis. The clonality of the plasma\par cell population cannot be determined by kappaISH and lambdaISH\par studies. Immunohistochemistry work up for light chain restriction\par are in proces and will be reported as an addendum. Correlation\par with clinical findings (e.g.: serum immunoglobulin levels, SPEP,\par IF studies) and if clinically indicated a bone marrow biopsy with\par flow cytometry and cytogenetic analysis is recommended.\par \par Microscopic description:   Cytology slides, touch preps, cell\par block and histologic sections of core biopsy consists of small\par fragments of marrow elements with 40-50% cellularity showing\par maturing trilineage hematopoiesis with mild\par \par \par \par \par \par JANES MOTA                          3\par \par \par \par Cytopathology Report\par \par \par \par \par plasmacytosis. Plasma cells are mostly distributed perivascularly\par and some interstitially, occasional small groups are seen.\par \par Immunohistochemistry:  CD20, CD3, CD34, , AE1-3, ,\par KappaISH, lambdaISH, kappa and lambda, MPO, CD71, E-cad, CD15\par stains  performed on formalin fixed paraffin embedded tissue,\par block 1B.\par \par CD34: Highlights vascular structures, no increase in blast\par population\par : Highlights erythroid series and scattered masts cells. No\par increase in blast population\par CD20: Highlights small B cells scattered interstitially\par CD3: Highlights small T cells scattered interstitially\par AE1-3: Negative\par \par  stain highlights plasma cells forming small clusters,\par comprising less than 5% marrow cellularity. KappaISH and\par lambdaISH stains are non-contributory.\par \par Kappa and lambda light chain immunostains are in process and will\par be reported as an addendum.\par \par MPO, CD71, E-cad and CD15 immunostains are in process and will be\par reported as an addendum.\par \par Flow cytometry analysis:  Not submitted\par \par Screened by: Christina MERCER (ASCP)\par Verified by: Vciki Prajapati MD\par (Electronic Signature)\par Reported on: 07/10/19 12:55 EDT, [address] [facility], [place], [place]\par [address]\par Cytology technical processing performed at [address] [place] [id number]\par _________________________________________________________________\par \par \par Specimen(s) Submitted\par BONE, VERTEBRAL BODY, S3, CT GUIDED CORE BIOPSY AND FNA\par \par \par Clinical Information\par Spinal cord lesion. Rule out cancer. No history of cancer. Rule\par out cordoma.\par \par \par \par \par \par \par \par JANES MOTA                          3\par \par \par \par Cytopathology Report\par \par \par \par \par CT Chest, abdomen and pelvis (06/08/2019): Lytic lesions of S3\par vertebral body, lucency in posterior T 1,  anterior T7, and\par anterior T2 vertebral bodies are indeterminate, possible\par representing small hemangiomas versus additional lytic lesions\par \par \par Gross Description\par Core Biopsy:\par Tissue collected: Specimen container has been inspected to\par confirm patient?s name and date of birth, contains multiple tan\par cores measuring 0.3 - 1.0 cm in length (and multiple fragments).\par Entire specimen submitted in 2 cassette(s) labeled 1B and 1C.\par \par 1 Touch prep slides ( 1 air dried + 0 fixed)\par \par FNA:\par Received: 4 air dried and 4 fixed slides\par Needle rinses in 20 ml formalin\par Submitted: cell block in one cassette labeled 1A\par \par Prepared:\par 1 Touch Prep, 4 Diff Quick,  4 Pap Stained slides\par 1 cell block  labeled 1A\par 1 core biopsy labeled 1B\par 1 core biopsy labeled 1C\par 12 Total slides\par \par  \par \par  Ordered by: DOCUMENT, SCM       Collected/Examined: 02Jul2019 03:11PM       \par Verification Not Required       Stage: Final       \par  Performed at: [facility]       Resulted: [id number] 03:10PM       Last Updated: 15Jul2019 03:10PM       Accession: [id number]

## 2021-12-08 NOTE — ED PROVIDER NOTE - PHYSICAL EXAMINATION
Physical Exam    Vital Signs: I have reviewed the initial vital signs.  Constitutional: well-nourished, appears stated age, no acute distress  Eyes: Conjunctiva pink, Sclera clear, PERRLA, EOMI, no ptosis, no entrapment,  Cardiovascular: S1 and S2, regular rate, regular rhythm, well-perfused extremities, radial pulses equal and 2+, calves nonttp, equal in size  Respiratory: unlabored respiratory effort, speaking in full sentences, handling oral secretions clear to auscultation bilaterally no wheezing, rales and rhonchi  Gastrointestinal: soft, non-tender abdomen  Integumentary: warm, dry, no rashes, lacerations,  Neurologic: awake, alert, cranial nerves II-XII grossly intact, extremities’ motor and sensory functions grossly intact, no nystagmus, tremors, fasiculations no  facial droop, no ataxia, no dysmetria  Psychiatric: appropriate mood, appropriate affect

## 2021-12-08 NOTE — ED PROVIDER NOTE - OBJECTIVE STATEMENT
86 y.o. M, pmh of Afib on Xarelto , HTN, HLD, anemia receiving IV iron infusions sent fro EGD suite for episode of bradycardia (40s and tachycardia 140)). Resolved in PACU. Denies HA ,dizziness visual changes near synope cp, palpitations. Has held xarelto x 2 days.

## 2021-12-09 ENCOUNTER — TRANSCRIPTION ENCOUNTER (OUTPATIENT)
Age: 86
End: 2021-12-09

## 2021-12-09 VITALS
OXYGEN SATURATION: 99 % | DIASTOLIC BLOOD PRESSURE: 78 MMHG | RESPIRATION RATE: 18 BRPM | TEMPERATURE: 98 F | SYSTOLIC BLOOD PRESSURE: 165 MMHG | HEART RATE: 91 BPM

## 2021-12-09 LAB
A1C WITH ESTIMATED AVERAGE GLUCOSE RESULT: 4.9 % — SIGNIFICANT CHANGE UP (ref 4–5.6)
ALBUMIN SERPL ELPH-MCNC: 3.8 G/DL — SIGNIFICANT CHANGE UP (ref 3.5–5.2)
ALP SERPL-CCNC: 107 U/L — SIGNIFICANT CHANGE UP (ref 30–115)
ALT FLD-CCNC: 11 U/L — SIGNIFICANT CHANGE UP (ref 0–41)
ANION GAP SERPL CALC-SCNC: 18 MMOL/L — HIGH (ref 7–14)
APTT BLD: 36.2 SEC — SIGNIFICANT CHANGE UP (ref 27–39.2)
AST SERPL-CCNC: 22 U/L — SIGNIFICANT CHANGE UP (ref 0–41)
BASOPHILS # BLD AUTO: 0.01 K/UL — SIGNIFICANT CHANGE UP (ref 0–0.2)
BASOPHILS NFR BLD AUTO: 0.2 % — SIGNIFICANT CHANGE UP (ref 0–1)
BILIRUB SERPL-MCNC: 0.5 MG/DL — SIGNIFICANT CHANGE UP (ref 0.2–1.2)
BUN SERPL-MCNC: 26 MG/DL — HIGH (ref 10–20)
CALCIUM SERPL-MCNC: 9.3 MG/DL — SIGNIFICANT CHANGE UP (ref 8.5–10.1)
CHLORIDE SERPL-SCNC: 104 MMOL/L — SIGNIFICANT CHANGE UP (ref 98–110)
CHOLEST SERPL-MCNC: 109 MG/DL — SIGNIFICANT CHANGE UP
CO2 SERPL-SCNC: 18 MMOL/L — SIGNIFICANT CHANGE UP (ref 17–32)
CREAT SERPL-MCNC: 1.4 MG/DL — SIGNIFICANT CHANGE UP (ref 0.7–1.5)
EOSINOPHIL # BLD AUTO: 0.01 K/UL — SIGNIFICANT CHANGE UP (ref 0–0.7)
EOSINOPHIL NFR BLD AUTO: 0.2 % — SIGNIFICANT CHANGE UP (ref 0–8)
ESTIMATED AVERAGE GLUCOSE: 94 MG/DL — SIGNIFICANT CHANGE UP (ref 68–114)
GLUCOSE SERPL-MCNC: 81 MG/DL — SIGNIFICANT CHANGE UP (ref 70–99)
HCT VFR BLD CALC: 29.8 % — LOW (ref 42–52)
HDLC SERPL-MCNC: 52 MG/DL — SIGNIFICANT CHANGE UP
HGB BLD-MCNC: 9.3 G/DL — LOW (ref 14–18)
IMM GRANULOCYTES NFR BLD AUTO: 0.2 % — SIGNIFICANT CHANGE UP (ref 0.1–0.3)
INR BLD: 1.84 RATIO — HIGH (ref 0.65–1.3)
LIPID PNL WITH DIRECT LDL SERPL: 44 MG/DL — SIGNIFICANT CHANGE UP
LYMPHOCYTES # BLD AUTO: 0.93 K/UL — LOW (ref 1.2–3.4)
LYMPHOCYTES # BLD AUTO: 16.5 % — LOW (ref 20.5–51.1)
MCHC RBC-ENTMCNC: 26.1 PG — LOW (ref 27–31)
MCHC RBC-ENTMCNC: 31.2 G/DL — LOW (ref 32–37)
MCV RBC AUTO: 83.5 FL — SIGNIFICANT CHANGE UP (ref 80–94)
MONOCYTES # BLD AUTO: 0.32 K/UL — SIGNIFICANT CHANGE UP (ref 0.1–0.6)
MONOCYTES NFR BLD AUTO: 5.7 % — SIGNIFICANT CHANGE UP (ref 1.7–9.3)
NEUTROPHILS # BLD AUTO: 4.36 K/UL — SIGNIFICANT CHANGE UP (ref 1.4–6.5)
NEUTROPHILS NFR BLD AUTO: 77.2 % — HIGH (ref 42.2–75.2)
NON HDL CHOLESTEROL: 57 MG/DL — SIGNIFICANT CHANGE UP
NRBC # BLD: 0 /100 WBCS — SIGNIFICANT CHANGE UP (ref 0–0)
PLATELET # BLD AUTO: 144 K/UL — SIGNIFICANT CHANGE UP (ref 130–400)
POTASSIUM SERPL-MCNC: 4.5 MMOL/L — SIGNIFICANT CHANGE UP (ref 3.5–5)
POTASSIUM SERPL-SCNC: 4.5 MMOL/L — SIGNIFICANT CHANGE UP (ref 3.5–5)
PROT SERPL-MCNC: 6.4 G/DL — SIGNIFICANT CHANGE UP (ref 6–8)
PROTHROM AB SERPL-ACNC: 21 SEC — HIGH (ref 9.95–12.87)
RBC # BLD: 3.57 M/UL — LOW (ref 4.7–6.1)
RBC # FLD: 24.4 % — HIGH (ref 11.5–14.5)
SODIUM SERPL-SCNC: 140 MMOL/L — SIGNIFICANT CHANGE UP (ref 135–146)
TRIGL SERPL-MCNC: 64 MG/DL — SIGNIFICANT CHANGE UP
WBC # BLD: 5.64 K/UL — SIGNIFICANT CHANGE UP (ref 4.8–10.8)
WBC # FLD AUTO: 5.64 K/UL — SIGNIFICANT CHANGE UP (ref 4.8–10.8)

## 2021-12-09 PROCEDURE — 93010 ELECTROCARDIOGRAM REPORT: CPT

## 2021-12-09 PROCEDURE — 99239 HOSP IP/OBS DSCHRG MGMT >30: CPT

## 2021-12-09 RX ORDER — LOSARTAN POTASSIUM 100 MG/1
25 TABLET, FILM COATED ORAL DAILY
Refills: 0 | Status: DISCONTINUED | OUTPATIENT
Start: 2021-12-09 | End: 2021-12-09

## 2021-12-09 RX ADMIN — MIRTAZAPINE 15 MILLIGRAM(S): 45 TABLET, ORALLY DISINTEGRATING ORAL at 11:46

## 2021-12-09 RX ADMIN — Medication 81 MILLIGRAM(S): at 11:46

## 2021-12-09 RX ADMIN — RIVAROXABAN 15 MILLIGRAM(S): KIT at 17:07

## 2021-12-09 RX ADMIN — LOSARTAN POTASSIUM 25 MILLIGRAM(S): 100 TABLET, FILM COATED ORAL at 09:31

## 2021-12-09 NOTE — DISCHARGE NOTE PROVIDER - CARE PROVIDER_API CALL
Justen Quinonez  Cardiology  [address] [place] [address]  Phone: [phone]  Fax: [phone]  Follow Up Time: 1 month

## 2021-12-09 NOTE — DISCHARGE NOTE NURSING/CASE MANAGEMENT/SOCIAL WORK - NSDCPEFALRISK_GEN_ALL_CORE
For information on Fall & Injury Prevention, visit: [email or web address] OR  [email or web address] OR  https://www.cdc.gov/steadi/patient.html

## 2021-12-09 NOTE — PROGRESS NOTE ADULT - ASSESSMENT
86 year old Male with Past Medical History of CAD s/p PCI, HLD, HTN, ?colon cancer s/p resection, ureteral stents, Chronic Afib on Xarelto presented with bradycardia and tachycardia episodes during routine Colonoscopy.     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 Would follow with EP for further plan of care.    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     - Keep NPO     - If no events in the next 24h, will discharge with MCOT for monitoring outpatient.     - If recurrence of tele events, will plan for PPM in the am.  - Will continue Xarelto  - Follow up with EP    # Depression  - Continue Mirtazepine 15 daily    # HLD  - Continue Statin    # CAD s/p PCI  - Continue Statin, Aspirin, not on Beta blocker    # BPH  - Continue Tamsulosin daily    # Normocytic Anemia  - Monitor Hgb    DVT: Xarelto  GI: Protonix  Dispo: Pending EP    Med rec with pharmacy and patient. Please verify with granddaughter as patient not 100% sure of his medications

## 2021-12-09 NOTE — CHART NOTE - NSCHARTNOTEFT_GEN_A_CORE
Pt on portable bedside telemetry, no capability of recording overnight events.   Pt remains asymptomatic. Denies dizziness, syncope, chest pain, SOB. palpitations.   EKG: NSR with PACs (12/9)    MCOT placed and education provided.    MCOT Instructions:  - Please wear now for 30 days  - Please return package back with prepaid envelope the day after your monitoring is complete (sensor, monitor, chargers, etc)  - Sensor must be charged every 5-7 days  - Monitor must be charged daily  - Change patch once a week, sooner if soiled or not adhering to skin  - Fu in 5-6 weeks with Dr. Quinonez to discuss results

## 2021-12-09 NOTE — DISCHARGE NOTE NURSING/CASE MANAGEMENT/SOCIAL WORK - PATIENT PORTAL LINK FT
You can access the FollowMyHealth Patient Portal offered by [facility] by registering at the following website: [email or web address]. By joining [facility]’s FollowMyHealth portal, you will also be able to view your health information using other applications (apps) compatible with our system.

## 2021-12-09 NOTE — DISCHARGE NOTE PROVIDER - HOSPITAL COURSE
HPI:  86 year old Male with Past Medical History of CAD s/p PCI, HLD, HTN, ?colon cancer s/p resection, ureteral stents, Chronic Afib on Xarelto presented with bradycardia and tachycardia episodes during routine Colonoscopy.     Patient had a colonoscopy at ambulatory office earlier today and was noted to have bradycardia to 40s and tachycardia to 150s during the perioperative period after anesthesia. Patient did not have any complications during the procedure. Reports no chest pain, abdominal pain, palpitations, nausea, vomiting, diarrhea, constipation, dysuria or any other complaints.    In ED patient hemodynamically stable, afebrile, EP consulted and recommended monitoring on tele for possible PPM in case any new event happens. 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 EP consulted, monitored on Tele, will be d/c w/ PANCHO

## 2021-12-09 NOTE — DISCHARGE NOTE PROVIDER - NSDCMRMEDTOKEN_GEN_ALL_CORE_FT
aspirin 81 mg oral tablet, chewable: 1 tab(s) orally once a day  ATORVASTATIN 40 MG TABLET: 1 each orally once a day (at bedtime)  MIRTAZAPINE 15 MG TABLET: 1 each orally once a day  SODIUM BICARB 650 MG TABLET: 1 each orally  TAMSULOSIN HCL 0.4 MG CAPSULE: 1 each orally once a day (at bedtime)  XARELTO 15 MG TABLET: 1 each orally once a day

## 2021-12-09 NOTE — DISCHARGE NOTE PROVIDER - NSDCCPCAREPLAN_GEN_ALL_CORE_FT
PRINCIPAL DISCHARGE DIAGNOSIS  Diagnosis: Bradycardia  Assessment and Plan of Treatment: you came to ED for a slow, then fast heart rate after getting sedated for a colonoscopy. We monitored you on telemetry (cardiac monitoring) and did not note any events. The electrophysiologist saw you and recommended you be discharged with an MCOT (cardiac monitor) for 30 days, at which time they will follow up with you and review your rhythm strips. Please f/u w/ Electrophysiology outpatient

## 2021-12-15 ENCOUNTER — NON-APPOINTMENT (OUTPATIENT)
Age: 86
End: 2021-12-15

## 2022-01-10 ENCOUNTER — NON-APPOINTMENT (OUTPATIENT)
Age: 87
End: 2022-01-10

## 2022-01-11 RX ORDER — WARFARIN 3 MG/1
3 TABLET ORAL
Refills: 0 | Status: DISCONTINUED | COMMUNITY
End: 2022-01-11

## 2022-01-31 ENCOUNTER — APPOINTMENT (OUTPATIENT)
Dept: CARDIOLOGY | Facility: CLINIC | Age: 87
End: 2022-01-31

## 2022-02-22 ENCOUNTER — APPOINTMENT (OUTPATIENT)
Dept: CARDIOLOGY | Facility: CLINIC | Age: 87
End: 2022-02-22
Payer: MEDICARE

## 2022-02-22 VITALS
HEART RATE: 85 BPM | TEMPERATURE: 98 F | BODY MASS INDEX: 18.99 KG/M2 | DIASTOLIC BLOOD PRESSURE: 70 MMHG | WEIGHT: 121 LBS | SYSTOLIC BLOOD PRESSURE: 120 MMHG | HEIGHT: 67 IN

## 2022-02-22 DIAGNOSIS — I48.91 UNSPECIFIED ATRIAL FIBRILLATION: ICD-10-CM

## 2022-02-22 PROCEDURE — 99215 OFFICE O/P EST HI 40 MIN: CPT

## 2022-02-22 PROCEDURE — 93000 ELECTROCARDIOGRAM COMPLETE: CPT

## 2022-03-01 ENCOUNTER — TRANSCRIPTION ENCOUNTER (OUTPATIENT)
Age: 87
End: 2022-03-01

## 2022-03-22 ENCOUNTER — OUTPATIENT (OUTPATIENT)
Dept: OUTPATIENT SERVICES | Facility: HOSPITAL | Age: 87
LOS: 1 days | Discharge: HOME | End: 2022-03-22

## 2022-03-22 VITALS
RESPIRATION RATE: 14 BRPM | SYSTOLIC BLOOD PRESSURE: 184 MMHG | TEMPERATURE: 97 F | HEART RATE: 62 BPM | WEIGHT: 119.27 LBS | DIASTOLIC BLOOD PRESSURE: 79 MMHG | HEIGHT: 67 IN | OXYGEN SATURATION: 98 %

## 2022-03-22 DIAGNOSIS — I48.0 PAROXYSMAL ATRIAL FIBRILLATION: ICD-10-CM

## 2022-03-22 DIAGNOSIS — Z01.818 ENCOUNTER FOR OTHER PREPROCEDURAL EXAMINATION: ICD-10-CM

## 2022-03-22 DIAGNOSIS — Z90.49 ACQUIRED ABSENCE OF OTHER SPECIFIED PARTS OF DIGESTIVE TRACT: Chronic | ICD-10-CM

## 2022-03-22 DIAGNOSIS — Z98.890 OTHER SPECIFIED POSTPROCEDURAL STATES: Chronic | ICD-10-CM

## 2022-03-22 DIAGNOSIS — Z95.5 PRESENCE OF CORONARY ANGIOPLASTY IMPLANT AND GRAFT: Chronic | ICD-10-CM

## 2022-03-22 LAB
ALBUMIN SERPL ELPH-MCNC: 4.5 G/DL — SIGNIFICANT CHANGE UP (ref 3.5–5.2)
ALP SERPL-CCNC: 87 U/L — SIGNIFICANT CHANGE UP (ref 30–115)
ALT FLD-CCNC: 13 U/L — SIGNIFICANT CHANGE UP (ref 0–41)
ANION GAP SERPL CALC-SCNC: 12 MMOL/L — SIGNIFICANT CHANGE UP (ref 7–14)
APPEARANCE UR: ABNORMAL
APTT BLD: 33.4 SEC — SIGNIFICANT CHANGE UP (ref 27–39.2)
AST SERPL-CCNC: 17 U/L — SIGNIFICANT CHANGE UP (ref 0–41)
BASOPHILS # BLD AUTO: 0.03 K/UL — SIGNIFICANT CHANGE UP (ref 0–0.2)
BASOPHILS NFR BLD AUTO: 0.5 % — SIGNIFICANT CHANGE UP (ref 0–1)
BILIRUB SERPL-MCNC: 0.5 MG/DL — SIGNIFICANT CHANGE UP (ref 0.2–1.2)
BILIRUB UR-MCNC: NEGATIVE — SIGNIFICANT CHANGE UP
BUN SERPL-MCNC: 37 MG/DL — HIGH (ref 10–20)
CALCIUM SERPL-MCNC: 9.8 MG/DL — SIGNIFICANT CHANGE UP (ref 8.5–10.1)
CHLORIDE SERPL-SCNC: 106 MMOL/L — SIGNIFICANT CHANGE UP (ref 98–110)
CO2 SERPL-SCNC: 25 MMOL/L — SIGNIFICANT CHANGE UP (ref 17–32)
COLOR SPEC: YELLOW — SIGNIFICANT CHANGE UP
CREAT SERPL-MCNC: 1.8 MG/DL — HIGH (ref 0.7–1.5)
DIFF PNL FLD: ABNORMAL
EGFR: 36 ML/MIN/1.73M2 — LOW
EOSINOPHIL # BLD AUTO: 0.05 K/UL — SIGNIFICANT CHANGE UP (ref 0–0.7)
EOSINOPHIL NFR BLD AUTO: 0.9 % — SIGNIFICANT CHANGE UP (ref 0–8)
GLUCOSE SERPL-MCNC: 98 MG/DL — SIGNIFICANT CHANGE UP (ref 70–99)
GLUCOSE UR QL: NEGATIVE — SIGNIFICANT CHANGE UP
HCT VFR BLD CALC: 29.6 % — LOW (ref 42–52)
HGB BLD-MCNC: 9.5 G/DL — LOW (ref 14–18)
IMM GRANULOCYTES NFR BLD AUTO: 0.5 % — HIGH (ref 0.1–0.3)
INR BLD: 1.13 RATIO — SIGNIFICANT CHANGE UP (ref 0.65–1.3)
KETONES UR-MCNC: NEGATIVE — SIGNIFICANT CHANGE UP
LEUKOCYTE ESTERASE UR-ACNC: ABNORMAL
LYMPHOCYTES # BLD AUTO: 0.96 K/UL — LOW (ref 1.2–3.4)
LYMPHOCYTES # BLD AUTO: 16.7 % — LOW (ref 20.5–51.1)
MCHC RBC-ENTMCNC: 29.7 PG — SIGNIFICANT CHANGE UP (ref 27–31)
MCHC RBC-ENTMCNC: 32.1 G/DL — SIGNIFICANT CHANGE UP (ref 32–37)
MCV RBC AUTO: 92.5 FL — SIGNIFICANT CHANGE UP (ref 80–94)
MONOCYTES # BLD AUTO: 0.32 K/UL — SIGNIFICANT CHANGE UP (ref 0.1–0.6)
MONOCYTES NFR BLD AUTO: 5.6 % — SIGNIFICANT CHANGE UP (ref 1.7–9.3)
NEUTROPHILS # BLD AUTO: 4.36 K/UL — SIGNIFICANT CHANGE UP (ref 1.4–6.5)
NEUTROPHILS NFR BLD AUTO: 75.8 % — HIGH (ref 42.2–75.2)
NITRITE UR-MCNC: NEGATIVE — SIGNIFICANT CHANGE UP
NRBC # BLD: 0 /100 WBCS — SIGNIFICANT CHANGE UP (ref 0–0)
PH UR: 6 — SIGNIFICANT CHANGE UP (ref 5–8)
PLATELET # BLD AUTO: 200 K/UL — SIGNIFICANT CHANGE UP (ref 130–400)
POTASSIUM SERPL-MCNC: 4.4 MMOL/L — SIGNIFICANT CHANGE UP (ref 3.5–5)
POTASSIUM SERPL-SCNC: 4.4 MMOL/L — SIGNIFICANT CHANGE UP (ref 3.5–5)
PROT SERPL-MCNC: 7.1 G/DL — SIGNIFICANT CHANGE UP (ref 6–8)
PROT UR-MCNC: ABNORMAL
PROTHROM AB SERPL-ACNC: 13 SEC — HIGH (ref 9.95–12.87)
RBC # BLD: 3.2 M/UL — LOW (ref 4.7–6.1)
RBC # FLD: 14.7 % — HIGH (ref 11.5–14.5)
SODIUM SERPL-SCNC: 143 MMOL/L — SIGNIFICANT CHANGE UP (ref 135–146)
SP GR SPEC: 1.02 — SIGNIFICANT CHANGE UP (ref 1.01–1.03)
UROBILINOGEN FLD QL: SIGNIFICANT CHANGE UP
WBC # BLD: 5.75 K/UL — SIGNIFICANT CHANGE UP (ref 4.8–10.8)
WBC # FLD AUTO: 5.75 K/UL — SIGNIFICANT CHANGE UP (ref 4.8–10.8)

## 2022-03-22 RX ORDER — TAMSULOSIN HYDROCHLORIDE 0.4 MG/1
1 CAPSULE ORAL
Qty: 0 | Refills: 4 | DISCHARGE

## 2022-03-22 RX ORDER — RIVAROXABAN 15 MG-20MG
1 KIT ORAL
Qty: 0 | Refills: 1 | DISCHARGE

## 2022-03-22 NOTE — H&P PST ADULT - REASON FOR ADMISSION
Case Type: OP Block TimeSuite: EP LabProceduralist: Justen Quinonez  Confirmed Surgery DateTime: 03- - 7:30PAST DateTime: 03- - 15:00Procedure: WATCHMAN/ MARIO  Laterality: N/ALength of Procedure: 180 MinutesAnesthesia Type: General

## 2022-03-22 NOTE — H&P PST ADULT - NSICDXPASTMEDICALHX_GEN_ALL_CORE_FT
PAST MEDICAL HISTORY:  Atrial fibrillation     Colon cancer     Coronary artery disease     Hyperlipidemia     Hypertension

## 2022-03-22 NOTE — H&P PST ADULT - NSICDXPASTSURGICALHX_GEN_ALL_CORE_FT
PAST SURGICAL HISTORY:  History of appendectomy     History of colostomy reversal     Status post colectomy

## 2022-03-23 ENCOUNTER — NON-APPOINTMENT (OUTPATIENT)
Age: 87
End: 2022-03-23

## 2022-03-23 DIAGNOSIS — Z22.322 CARRIER OR SUSPECTED CARRIER OF METHICILLIN RESISTANT STAPHYLOCOCCUS AUREUS: ICD-10-CM

## 2022-03-23 LAB
BACTERIA # UR AUTO: NEGATIVE — SIGNIFICANT CHANGE UP
EPI CELLS # UR: 0 /HPF — SIGNIFICANT CHANGE UP (ref 0–5)
HYALINE CASTS # UR AUTO: 0 /LPF — SIGNIFICANT CHANGE UP (ref 0–7)
MRSA PCR RESULT.: POSITIVE
RBC CASTS # UR COMP ASSIST: 17 /HPF — HIGH (ref 0–4)
WBC UR QL: 580 /HPF — HIGH (ref 0–5)

## 2022-03-24 LAB
CULTURE RESULTS: SIGNIFICANT CHANGE UP
SPECIMEN SOURCE: SIGNIFICANT CHANGE UP

## 2022-03-26 ENCOUNTER — LABORATORY RESULT (OUTPATIENT)
Age: 87
End: 2022-03-26

## 2022-03-29 ENCOUNTER — INPATIENT (INPATIENT)
Facility: HOSPITAL | Age: 87
LOS: 0 days | Discharge: HOME | End: 2022-03-30
Attending: STUDENT IN AN ORGANIZED HEALTH CARE EDUCATION/TRAINING PROGRAM | Admitting: STUDENT IN AN ORGANIZED HEALTH CARE EDUCATION/TRAINING PROGRAM
Payer: MEDICARE

## 2022-03-29 VITALS — HEIGHT: 67 IN | WEIGHT: 119.27 LBS

## 2022-03-29 DIAGNOSIS — I10 ESSENTIAL (PRIMARY) HYPERTENSION: ICD-10-CM

## 2022-03-29 DIAGNOSIS — I25.10 ATHEROSCLEROTIC HEART DISEASE OF NATIVE CORONARY ARTERY WITHOUT ANGINA PECTORIS: ICD-10-CM

## 2022-03-29 DIAGNOSIS — Z90.49 ACQUIRED ABSENCE OF OTHER SPECIFIED PARTS OF DIGESTIVE TRACT: Chronic | ICD-10-CM

## 2022-03-29 DIAGNOSIS — I48.0 PAROXYSMAL ATRIAL FIBRILLATION: ICD-10-CM

## 2022-03-29 DIAGNOSIS — I47.2 VENTRICULAR TACHYCARDIA: ICD-10-CM

## 2022-03-29 DIAGNOSIS — Z79.82 LONG TERM (CURRENT) USE OF ASPIRIN: ICD-10-CM

## 2022-03-29 DIAGNOSIS — Z00.6 ENCOUNTER FOR EXAMINATION FOR NORMAL COMPARISON AND CONTROL IN CLINICAL RESEARCH PROGRAM: ICD-10-CM

## 2022-03-29 DIAGNOSIS — Z87.891 PERSONAL HISTORY OF NICOTINE DEPENDENCE: ICD-10-CM

## 2022-03-29 DIAGNOSIS — I49.5 SICK SINUS SYNDROME: ICD-10-CM

## 2022-03-29 DIAGNOSIS — Z98.890 OTHER SPECIFIED POSTPROCEDURAL STATES: Chronic | ICD-10-CM

## 2022-03-29 DIAGNOSIS — Z90.49 ACQUIRED ABSENCE OF OTHER SPECIFIED PARTS OF DIGESTIVE TRACT: ICD-10-CM

## 2022-03-29 DIAGNOSIS — E78.5 HYPERLIPIDEMIA, UNSPECIFIED: ICD-10-CM

## 2022-03-29 DIAGNOSIS — Z85.038 PERSONAL HISTORY OF OTHER MALIGNANT NEOPLASM OF LARGE INTESTINE: ICD-10-CM

## 2022-03-29 LAB — GLUCOSE BLDC GLUCOMTR-MCNC: 103 MG/DL — HIGH (ref 70–99)

## 2022-03-29 PROCEDURE — 93312 ECHO TRANSESOPHAGEAL: CPT | Mod: 26

## 2022-03-29 PROCEDURE — 33340 PERQ CLSR TCAT L ATR APNDGE: CPT | Mod: Q0

## 2022-03-29 PROCEDURE — 93306 TTE W/DOPPLER COMPLETE: CPT | Mod: 26

## 2022-03-29 RX ORDER — RIVAROXABAN 15 MG-20MG
15 KIT ORAL
Refills: 0 | Status: DISCONTINUED | OUTPATIENT
Start: 2022-03-29 | End: 2022-03-30

## 2022-03-29 RX ORDER — ASPIRIN/CALCIUM CARB/MAGNESIUM 324 MG
81 TABLET ORAL DAILY
Refills: 0 | Status: DISCONTINUED | OUTPATIENT
Start: 2022-03-29 | End: 2022-03-30

## 2022-03-29 RX ORDER — LOSARTAN POTASSIUM 100 MG/1
50 TABLET, FILM COATED ORAL DAILY
Refills: 0 | Status: DISCONTINUED | OUTPATIENT
Start: 2022-03-29 | End: 2022-03-30

## 2022-03-29 RX ORDER — MIRTAZAPINE 45 MG/1
15 TABLET, ORALLY DISINTEGRATING ORAL DAILY
Refills: 0 | Status: DISCONTINUED | OUTPATIENT
Start: 2022-03-29 | End: 2022-03-30

## 2022-03-29 RX ORDER — CEFAZOLIN SODIUM 1 G
2000 VIAL (EA) INJECTION ONCE
Refills: 0 | Status: COMPLETED | OUTPATIENT
Start: 2022-03-29 | End: 2022-03-29

## 2022-03-29 RX ORDER — SODIUM BICARBONATE 1 MEQ/ML
650 SYRINGE (ML) INTRAVENOUS DAILY
Refills: 0 | Status: DISCONTINUED | OUTPATIENT
Start: 2022-03-29 | End: 2022-03-30

## 2022-03-29 RX ORDER — TAMSULOSIN HYDROCHLORIDE 0.4 MG/1
0.4 CAPSULE ORAL AT BEDTIME
Refills: 0 | Status: DISCONTINUED | OUTPATIENT
Start: 2022-03-29 | End: 2022-03-30

## 2022-03-29 RX ORDER — HYDROCHLOROTHIAZIDE 25 MG
12.5 TABLET ORAL DAILY
Refills: 0 | Status: DISCONTINUED | OUTPATIENT
Start: 2022-03-29 | End: 2022-03-30

## 2022-03-29 RX ORDER — CEFAZOLIN SODIUM 1 G
1000 VIAL (EA) INJECTION EVERY 8 HOURS
Refills: 0 | Status: COMPLETED | OUTPATIENT
Start: 2022-03-29 | End: 2022-03-30

## 2022-03-29 RX ORDER — ATORVASTATIN CALCIUM 80 MG/1
40 TABLET, FILM COATED ORAL AT BEDTIME
Refills: 0 | Status: DISCONTINUED | OUTPATIENT
Start: 2022-03-29 | End: 2022-03-30

## 2022-03-29 RX ADMIN — Medication 100 MILLIGRAM(S): at 08:45

## 2022-03-29 RX ADMIN — Medication 650 MILLIGRAM(S): at 15:32

## 2022-03-29 RX ADMIN — Medication 100 MILLIGRAM(S): at 23:15

## 2022-03-29 RX ADMIN — RIVAROXABAN 15 MILLIGRAM(S): KIT at 18:11

## 2022-03-29 RX ADMIN — ATORVASTATIN CALCIUM 40 MILLIGRAM(S): 80 TABLET, FILM COATED ORAL at 21:38

## 2022-03-29 RX ADMIN — Medication 81 MILLIGRAM(S): at 15:32

## 2022-03-29 RX ADMIN — MIRTAZAPINE 15 MILLIGRAM(S): 45 TABLET, ORALLY DISINTEGRATING ORAL at 15:32

## 2022-03-29 RX ADMIN — TAMSULOSIN HYDROCHLORIDE 0.4 MILLIGRAM(S): 0.4 CAPSULE ORAL at 21:38

## 2022-03-29 RX ADMIN — Medication 100 MILLIGRAM(S): at 18:10

## 2022-03-29 NOTE — PATIENT PROFILE ADULT - FALL HARM RISK - HARM RISK INTERVENTIONS

## 2022-03-29 NOTE — PRE-ANESTHESIA EVALUATION ADULT - HEIGHT IN INCHES
"     Office Note      Date: 2021  Patient Name: Keo Kang  MRN: [id number]  : 1970    Chief Complaint   Patient presents with   • Diabetes       History of Present Illness:   Keo Kang is a 51 y.o. male who presents for Diabetes - on insulin /type 1  On 4 shots per day.   Last A1c:  Hemoglobin A1C   Date Value Ref Range Status   2021 7.8  Final       Changes in health since last visit: none . Last eye exam up to date .    Subjective         Review of Systems:   Review of Systems   Constitutional: Negative.    HENT: Negative.    Eyes: Negative.    Respiratory: Negative.        The following portions of the patient's history were reviewed and updated as appropriate: allergies, current medications, past family history, past medical history, past social history, past surgical history and problem list.    Objective     Visit Vitals  /72   Pulse 82   Ht 170.2 cm (67\")   Wt 106 kg (234 lb)   SpO2 96%   BMI 36.65 kg/m²       Labs:    CMP  No results found for: GLUCOSE, BUN, CREATININE, EGFRIFNONA, EGFRIFAFRI, BCR, K, CO2, CALCIUM, PROTENTOTREF, LABIL2, BILIRUBIN, AST, ALT     CBC w/DIFF  No results found for: WBC, RBC, HGB, HCT, MCV, MCH, MCHC, RDW, RDWSD, MPV, PLT, NEUTRORELPCT, LYMPHORELPCT, MONORELPCT, EOSRELPCT, BASORELPCT, AUTOIGPER, NEUTROABS, LYMPHSABS, MONOSABS, EOSABS, BASOSABS, AUTOIGNUM, NRBC    Physical Exam:  Physical Exam  Vitals reviewed.   Constitutional:       Appearance: Normal appearance. He is normal weight.   Cardiovascular:      Pulses:           Dorsalis pedis pulses are 2+ on the right side and 2+ on the left side.        Posterior tibial pulses are 2+ on the right side and 2+ on the left side.   Musculoskeletal:      Right foot: Normal range of motion. No deformity, bunion, Charcot foot, foot drop or prominent metatarsal heads.      Left foot: Normal range of motion. No deformity, bunion, Charcot foot, foot drop or prominent metatarsal heads.   Feet:      Right " foot:      Protective Sensation: 5 sites tested. 5 sites sensed.      Skin integrity: Skin integrity normal.      Toenail Condition: Right toenails are normal.      Left foot:      Protective Sensation: 5 sites tested. 5 sites sensed.      Skin integrity: Skin integrity normal.      Toenail Condition: Left toenails are normal.      Comments: Diabetic Foot Exam Performed and Monofilament Test Performed    Neurological:      Mental Status: He is alert.   Psychiatric:         Mood and Affect: Mood normal.         Thought Content: Thought content normal.         Judgment: Judgment normal.          Assessment / Plan      Assessment & Plan:  Problem List Items Addressed This Visit        Other    Type 1 diabetes mellitus with hypoglycemia (HCC) - Primary    Current Assessment & Plan     Diabetes is unchanged.   Continue current treatment regimen.  Diabetes will be reassessed in 6 months.         Relevant Medications    NovoLOG FlexPen 100 UNIT/ML solution pen-injector sc pen    Tresiba FlexTouch 200 UNIT/ML solution pen-injector pen injection    Accu-Chek SmartView test strip           Dante Lou MD   12/17/2021   7

## 2022-03-29 NOTE — CHART NOTE - NSCHARTNOTEFT_GEN_A_CORE
Electrophysiology Brief Post-Op Note      I have personally seen and examined the patient.  I agree with the history and physical which I have reviewed and noted any changes below.     PRE-OP DIAGNOSIS: Atrial Fibrillation    POST-OP DIAGNOSIS: Atrial Fibrillation    PROCEDURE:  Left Atrial Appendage Closure - Watchman Device -  Transesophageal Echocardiogram      Vascular Access used (using Ultrasound Guidance)  -Right Femoral Vein: 16F  -Left Femoral Vein: 11F  -Right Femoral Artery: none    All sheaths and wires removed,   Perclose sutures applied (2 perclose for 16F, 1 perclose to 11F)  and manual pressure applied    Physician: Cristiano  Assistant: Cardiology fellow    ANESTHESIA TYPE:  [X]General Anesthesia  [  ] Sedation  [  ] Local/Regional        CONDITION  [  ] Critical  [  ] Serious  [  ]Fair  [X]Good      SPECIMENS REMOVED (IF APPLICABLE): NONE      IMPLANTS (IF APPLICABLE):  Watchman Device 27 mm  Watchman Device 24 mm (used, removed)      FINDINGS  Successful deployment of the Watchman Device in the left atrial appendage  No LA/IVIS thrombus  No pericardial effusion on MARIO/ICE    ESTIMATED BLOOD LOSS:  50 mL  Contrast Use: 15 cc      No complications      PLAN OF CARE  -	Resume full dose anticoagulation after procedure (continuous schedule) Xarelto 15 mg at 2 pm  -             Start Aspirin 81 mg now  -	Bed rest for 4 hours  -	Admit to cardiac telemetry service

## 2022-03-30 ENCOUNTER — TRANSCRIPTION ENCOUNTER (OUTPATIENT)
Age: 87
End: 2022-03-30

## 2022-03-30 VITALS
TEMPERATURE: 98 F | DIASTOLIC BLOOD PRESSURE: 56 MMHG | HEART RATE: 79 BPM | RESPIRATION RATE: 18 BRPM | SYSTOLIC BLOOD PRESSURE: 115 MMHG

## 2022-03-30 DIAGNOSIS — I25.10 ATHEROSCLEROTIC HEART DISEASE OF NATIVE CORONARY ARTERY WITHOUT ANGINA PECTORIS: ICD-10-CM

## 2022-03-30 DIAGNOSIS — C18.9 MALIGNANT NEOPLASM OF COLON, UNSPECIFIED: ICD-10-CM

## 2022-03-30 DIAGNOSIS — E78.5 HYPERLIPIDEMIA, UNSPECIFIED: ICD-10-CM

## 2022-03-30 DIAGNOSIS — I10 ESSENTIAL (PRIMARY) HYPERTENSION: ICD-10-CM

## 2022-03-30 PROCEDURE — 99238 HOSP IP/OBS DSCHRG MGMT 30/<: CPT

## 2022-03-30 PROCEDURE — 99232 SBSQ HOSP IP/OBS MODERATE 35: CPT

## 2022-03-30 PROCEDURE — 93010 ELECTROCARDIOGRAM REPORT: CPT

## 2022-03-30 RX ORDER — METOPROLOL TARTRATE 50 MG
25 TABLET ORAL DAILY
Refills: 0 | Status: DISCONTINUED | OUTPATIENT
Start: 2022-03-30 | End: 2022-03-30

## 2022-03-30 RX ORDER — SODIUM BICARBONATE 1 MEQ/ML
1 SYRINGE (ML) INTRAVENOUS
Qty: 0 | Refills: 3 | DISCHARGE

## 2022-03-30 RX ORDER — METOPROLOL TARTRATE 50 MG
1 TABLET ORAL
Qty: 30 | Refills: 0
Start: 2022-03-30 | End: 2022-04-28

## 2022-03-30 RX ORDER — TAMSULOSIN HYDROCHLORIDE 0.4 MG/1
0 CAPSULE ORAL
Qty: 0 | Refills: 1 | DISCHARGE

## 2022-03-30 RX ORDER — TAMSULOSIN HYDROCHLORIDE 0.4 MG/1
1 CAPSULE ORAL
Qty: 30 | Refills: 0
Start: 2022-03-30 | End: 2022-04-28

## 2022-03-30 RX ORDER — RIVAROXABAN 15 MG-20MG
1 KIT ORAL
Qty: 30 | Refills: 0
Start: 2022-03-30 | End: 2022-04-28

## 2022-03-30 RX ADMIN — Medication 81 MILLIGRAM(S): at 11:30

## 2022-03-30 RX ADMIN — Medication 100 MILLIGRAM(S): at 08:00

## 2022-03-30 RX ADMIN — Medication 650 MILLIGRAM(S): at 11:31

## 2022-03-30 RX ADMIN — Medication 12.5 MILLIGRAM(S): at 06:28

## 2022-03-30 RX ADMIN — MIRTAZAPINE 15 MILLIGRAM(S): 45 TABLET, ORALLY DISINTEGRATING ORAL at 11:31

## 2022-03-30 RX ADMIN — LOSARTAN POTASSIUM 50 MILLIGRAM(S): 100 TABLET, FILM COATED ORAL at 06:28

## 2022-03-30 NOTE — DISCHARGE NOTE NURSING/CASE MANAGEMENT/SOCIAL WORK - NSDCPEFALRISK_GEN_ALL_CORE
For information on Fall & Injury Prevention, visit: [email or web address] OR  [email or web address] OR  https://www.cdc.gov/steadi/patient.html

## 2022-03-30 NOTE — DISCHARGE NOTE PROVIDER - NSDCFUADDINST_GEN_ALL_CORE_FT
- Please continue Xarelto 15mg daily with a meal   - Start Aspirin 81mg daily  - No heavy lifting or exertional activities for 5-7days  - No driving for 1 week  - You may take a shower, no bathtub for 5days, do not submerge yourself in water  - FU in EP office with Dr Quinonez on 5/9 @1pm  [address] [place] [address], [address] [facility]  [phone]

## 2022-03-30 NOTE — DISCHARGE NOTE PROVIDER - ATTENDING DISCHARGE PHYSICAL EXAMINATION:
Patient seen and examined. Pertinent labs, imaging and telemetry reviewed. I agree with the above:    Patient feels well. No complaints this morning.    Patient seen and examined. Pertinent labs, imaging and telemetry reviewed. I agree with the above:    Patient feels well. No complaints this morning.   Pt with some AF with RVR, but only lasting seconds.   Will start on Toprol 25mg PO daily.     Irregularly irregular. S1S2 present  CTA B/L  Groin site CDI without hematoma    Patient is stable for discharge home with outpatient follow up.

## 2022-03-30 NOTE — DISCHARGE NOTE PROVIDER - NSDCCPTREATMENT_GEN_ALL_CORE_FT
PRINCIPAL PROCEDURE  Procedure: Insertion, Watchman left atrial appendage closure device  Findings and Treatment: You underwent Left atrial appendage closure device procedure with Dr. Quinonez on 3/29/22.

## 2022-03-30 NOTE — PROGRESS NOTE ADULT - SUBJECTIVE AND OBJECTIVE BOX
INTERVAL HPI/OVERNIGHT EVENTS:    Patient s/p         Watchman device palcement       No events over night      MEDICATIONS  (STANDING):  aspirin  chewable 81 milliGRAM(s) Oral daily  atorvastatin 40 milliGRAM(s) Oral at bedtime  hydrochlorothiazide 12.5 milliGRAM(s) Oral daily  losartan 50 milliGRAM(s) Oral daily  mirtazapine 15 milliGRAM(s) Oral daily  rivaroxaban 15 milliGRAM(s) Oral with dinner  sodium bicarbonate 650 milliGRAM(s) Oral daily  tamsulosin 0.4 milliGRAM(s) Oral at bedtime    MEDICATIONS  (PRN):      Allergies    No Known Allergies    Intolerances          Vital Signs Last 24 Hrs  T(C): 36.4 (30 Mar 2022 05:03), Max: 36.4 (29 Mar 2022 17:16)  T(F): 97.6 (30 Mar 2022 05:03), Max: 97.6 (30 Mar 2022 05:03)  HR: 55 (30 Mar 2022 05:03) (55 - 92)  BP: 144/67 (30 Mar 2022 05:03) (123/62 - 155/84)  BP(mean): --  RR: 18 (29 Mar 2022 17:16) (18 - 18)  SpO2: 98% (30 Mar 2022 07:50) (98% - 98%)    GENERAL: In no apparent distress, well nourished, and hydrated.  HEART: IRRegular rate and rhythm; No murmurs, rubs, or gallops.  PULMONARY: Clear to auscultation and perfusion.  No rales, wheezing, or rhonchi bilaterally.  ABDOMEN: Soft, Nontender, Nondistended; Bowel sounds present  EXTREMITIES:  2+ Peripheral Pulses, No clubbing, cyanosis, or edema  groins No hematoma, no bleeding;   NEUROLOGICAL: Grossly nonfocal    LABS:                EKG:

## 2022-03-30 NOTE — DISCHARGE NOTE PROVIDER - NSDCCPCAREPLAN_GEN_ALL_CORE_FT
PRINCIPAL DISCHARGE DIAGNOSIS  Diagnosis: Chronic atrial fibrillation  Assessment and Plan of Treatment:

## 2022-03-30 NOTE — DISCHARGE NOTE NURSING/CASE MANAGEMENT/SOCIAL WORK - PATIENT PORTAL LINK FT
You can access the FollowMyHealth Patient Portal offered by [facility] by registering at the following website: [email or web address]. By joining [employer]’s FollowMyHealth portal, you will also be able to view your health information using other applications (apps) compatible with our system.

## 2022-03-30 NOTE — DISCHARGE NOTE PROVIDER - CARE PROVIDER_API CALL
Justen Quinonez)  Cardiac Electrophysiology; Cardiovascular Disease; [facility] Medicine  [address] [place] [address]  Phone: [phone]  Fax: [phone]  Scheduled Appointment: 05/09/2022 01:00 PM

## 2022-03-30 NOTE — DISCHARGE NOTE PROVIDER - HOSPITAL COURSE
Mr. 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 Patient underwent Left Atrial Appendage Closure- Watchman Device and MARIO on 3/29/22 with Dr. Quinonez without complications. No La/IVIS thrombus and no pericardial effusion on MARIO/ICE. No overnight events noted. Patient stable for discharge and will followup with Dr. Quinonez on 5/9/22 @ 1p[address].

## 2022-03-30 NOTE — DISCHARGE NOTE PROVIDER - NSDCMRMEDTOKEN_GEN_ALL_CORE_FT
aspirin 81 mg oral tablet, chewable: 1 tab(s) orally once a day  ATORVASTATIN 40 MG TABLET: 1 each orally once a day (at bedtime)  LOSARTAN-HCTZ 50-12.5 MG TAB: TAKE 1 TABLET BY MOUTH EVERY DAY  metoprolol succinate 25 mg oral tablet, extended release: 1 tab(s) orally once a day  MIRTAZAPINE 15 MG TABLET: 1 each orally once a day  rivaroxaban 15 mg oral tablet: 1 tab(s) orally once a day (before a meal)  sodium bicarbonate 650 mg oral tablet: 1 tab(s) orally 2 times a day  tamsulosin 0.4 mg oral capsule: 1 cap(s) orally once a day (at bedtime)

## 2022-03-30 NOTE — PROGRESS NOTE ADULT - ASSESSMENT
A/P Watchman device placement  Patient S/P       Patient is doing well  - continue Xarelto   - start Aspirin 81mg daily  - No heavy lifting or exertional activities for 5-7days  -  no driving for 1 week  - Can take a shower, no bathtub for 5days, do not submerge yourself in water  - FU in EP office with Dr Quinonez on 5/9 @1pm  [address] [place] [address], [address]  [phone]

## 2022-04-03 PROBLEM — I48.91 ATRIAL FIBRILLATION: Noted: 2021-02-12

## 2022-04-03 NOTE — REASON FOR VISIT
[Arrhythmia/ECG Abnorrmalities] : arrhythmia/ECG abnormalities [FreeTextEntry3] : Dr. Popeye Navarro [FreeTextEntry1] : [facility] [phone]

## 2022-04-03 NOTE — CARDIOLOGY SUMMARY
[de-identified] : (2/22/2022) ECG: sinus rhythm at 78 bpm, frequent APCs [de-identified] : EVENT MONITOR/MCOT RESULT SUMMARY:\par (see scanned report from tracings)\par \par Dates: 12/9/2021 to 01/07/2021\par Duration: 28 days\par Average heart rate (bpm): 77\par Range heart rate (bpm): \par Tachyarrhythmias: Atrial Fibrillation 65%, no sustained SVT, no sustained VT\par Bradyarrhythmias: no pauses, no significant bradycardia\par PVC burden: 1%\par APC burden: 11%\par \par SUMMARY: Atrial Fibrillation with burden 65%, no pauses, bradycardia at night. NSVT at 120-130 bpm

## 2022-04-03 NOTE — DISCUSSION/SUMMARY
[FreeTextEntry1] : Mr. Constantino Schultz is a pleasant 86 year-old man with hypertension, hyperlipidemia, paroxysmal atrial fibrillation (previously stopped xarelto by PCP due to anemia and unsteady gait), CAD s/p PCI LAD 11/29/2017, Colon cancer, anemia and unsteady gait. Patient is here to discuss IVIS closure.\par \par I have discussed different treatment options with the patient including other anticoagulation medication. I have explained the risks and benefits of the procedure to the patient. I have explained to the patient the patient will require to be on blood thinners (warfarin or NOACs) for 45 days after implant and MARIO will be repeated. If there is no significant leak and no device clot, patient will remain on aspirin and Plavix for next 6 months, and then aspirin only. There is approximately 1-2% chance of any major cardiovascular complication to occur. Complications include, but are not limited to infection, bleeding, and damage to the vessels, hole in the heart, stroke, death, heart attack, injury to esophagus, aspiration, device dislodgement, and 1-5% risk of device related clot that will require anticoagulation. The patient/family understand the risk and would like to proceed with the procedure. Educational Materials and references were provided to the patient. Patient/family indicated that all of his questions were answered to their satisfaction and verbalized understanding.\par Patients CHADVASC Score is 4\par Patients HASBLED score is 4\par (Hypertension, Abnormal Renal/Liver Function, Stroke, Bleeding History or Predisposition, Labile INR, >65, Antiplatelet agents, NSAID, Drugs/Alcohol)\par \par Dr. Navarro recommends and agrees with implant of IVSI closure with watchman\par \par I discussed with patient plan of care in great details. I answered all his questions to his satisfaction. Patient was pleased with the visit.\par \par Patient will follow with me in 2 months’ time. Please do not hesitate to contact me at [phone] if you have any further questions regarding this patient care.\par \par \par

## 2022-04-03 NOTE — HISTORY OF PRESENT ILLNESS
[FreeTextEntry1] : AFib (stopped xarelto by PCP due to anemia and unsteady gait), diagnosed in 2011, evaluated for Watchman in 2019 at [facility] due to Anemia and unsteady gait\par CAD s/p PCI LAD 11/29/2017, Colon cancer, Hyperlipidemia, hypertension\par He has no chest pain, no shortness of breath, no dyspnea on exertion, no orthopnea, no PND. He denies dizziness, lightheadedness and syncope. limited exercise capacity. \par here for evaluation for Left Atrial Appendage Closure- Watchman Device

## 2022-05-09 ENCOUNTER — LABORATORY RESULT (OUTPATIENT)
Age: 87
End: 2022-05-09

## 2022-05-09 ENCOUNTER — APPOINTMENT (OUTPATIENT)
Dept: CARDIOLOGY | Facility: CLINIC | Age: 87
End: 2022-05-09

## 2022-05-09 VITALS
DIASTOLIC BLOOD PRESSURE: 88 MMHG | WEIGHT: 123.25 LBS | TEMPERATURE: 97.3 F | HEART RATE: 77 BPM | HEIGHT: 67 IN | SYSTOLIC BLOOD PRESSURE: 129 MMHG | BODY MASS INDEX: 19.35 KG/M2

## 2022-05-09 VITALS — HEIGHT: 60 IN | BODY MASS INDEX: 24.07 KG/M2

## 2022-05-09 DIAGNOSIS — I10 ESSENTIAL (PRIMARY) HYPERTENSION: ICD-10-CM

## 2022-05-09 DIAGNOSIS — E78.00 PURE HYPERCHOLESTEROLEMIA, UNSPECIFIED: ICD-10-CM

## 2022-05-09 DIAGNOSIS — I25.10 ATHEROSCLEROTIC HEART DISEASE OF NATIVE CORONARY ARTERY W/OUT ANGINA PECTORIS: ICD-10-CM

## 2022-05-09 DIAGNOSIS — I48.0 PAROXYSMAL ATRIAL FIBRILLATION: ICD-10-CM

## 2022-05-09 DIAGNOSIS — Z98.61 ATHEROSCLEROTIC HEART DISEASE OF NATIVE CORONARY ARTERY W/OUT ANGINA PECTORIS: ICD-10-CM

## 2022-05-09 PROBLEM — C18.9 MALIGNANT NEOPLASM OF COLON, UNSPECIFIED: Chronic | Status: ACTIVE | Noted: 2022-03-22

## 2022-05-09 PROBLEM — I48.91 UNSPECIFIED ATRIAL FIBRILLATION: Chronic | Status: ACTIVE | Noted: 2022-03-22

## 2022-05-09 PROCEDURE — 93000 ELECTROCARDIOGRAM COMPLETE: CPT

## 2022-05-09 PROCEDURE — 99214 OFFICE O/P EST MOD 30 MIN: CPT

## 2022-05-09 RX ORDER — MUPIROCIN 20 MG/G
2 OINTMENT TOPICAL TWICE DAILY
Qty: 1 | Refills: 0 | Status: COMPLETED | COMMUNITY
Start: 2022-03-23 | End: 2022-05-09

## 2022-05-09 RX ORDER — LOSARTAN POTASSIUM AND HYDROCHLOROTHIAZIDE 12.5; 5 MG/1; MG/1
50-12.5 TABLET ORAL DAILY
Refills: 0 | Status: ACTIVE | COMMUNITY

## 2022-05-09 RX ORDER — TAMSULOSIN HYDROCHLORIDE 0.4 MG/1
0.4 CAPSULE ORAL DAILY
Refills: 0 | Status: ACTIVE | COMMUNITY

## 2022-05-09 RX ORDER — SODIUM BICARBONATE 650 MG/1
650 TABLET ORAL TWICE DAILY
Refills: 0 | Status: ACTIVE | COMMUNITY

## 2022-05-09 RX ORDER — ATORVASTATIN CALCIUM 40 MG/1
40 TABLET, FILM COATED ORAL DAILY
Refills: 0 | Status: ACTIVE | COMMUNITY

## 2022-05-09 RX ORDER — MIRTAZAPINE 15 MG/1
15 TABLET, FILM COATED ORAL
Qty: 90 | Refills: 0 | Status: ACTIVE | COMMUNITY
Start: 2021-11-10

## 2022-05-09 RX ORDER — POLYETHYLENE GLYCOL-3350 AND ELECTROLYTES 236; 6.74; 5.86; 2.97; 22.74 G/274.31G; G/274.31G; G/274.31G; G/274.31G; G/274.31G
236 POWDER, FOR SOLUTION ORAL
Qty: 4000 | Refills: 0 | Status: COMPLETED | COMMUNITY
Start: 2021-11-30 | End: 2022-05-09

## 2022-05-09 RX ORDER — ASPIRIN 81 MG/1
81 TABLET ORAL DAILY
Refills: 0 | Status: ACTIVE | COMMUNITY

## 2022-05-15 ENCOUNTER — FORM ENCOUNTER (OUTPATIENT)
Age: 87
End: 2022-05-15

## 2022-05-16 ENCOUNTER — OUTPATIENT (OUTPATIENT)
Dept: OUTPATIENT SERVICES | Facility: HOSPITAL | Age: 87
LOS: 1 days | Discharge: HOME | End: 2022-05-16
Payer: MEDICARE

## 2022-05-16 VITALS — HEIGHT: 67 IN | WEIGHT: 123.02 LBS

## 2022-05-16 DIAGNOSIS — I48.0 PAROXYSMAL ATRIAL FIBRILLATION: ICD-10-CM

## 2022-05-16 DIAGNOSIS — Z98.890 OTHER SPECIFIED POSTPROCEDURAL STATES: Chronic | ICD-10-CM

## 2022-05-16 DIAGNOSIS — Z90.49 ACQUIRED ABSENCE OF OTHER SPECIFIED PARTS OF DIGESTIVE TRACT: Chronic | ICD-10-CM

## 2022-05-16 LAB
HCT VFR BLD CALC: 23.2 % — LOW (ref 42–52)
HGB BLD-MCNC: 7.4 G/DL — LOW (ref 14–18)
MCHC RBC-ENTMCNC: 27.6 PG — SIGNIFICANT CHANGE UP (ref 27–31)
MCHC RBC-ENTMCNC: 31.9 G/DL — LOW (ref 32–37)
MCV RBC AUTO: 86.6 FL — SIGNIFICANT CHANGE UP (ref 80–94)
NRBC # BLD: 0 /100 WBCS — SIGNIFICANT CHANGE UP (ref 0–0)
PLATELET # BLD AUTO: 196 K/UL — SIGNIFICANT CHANGE UP (ref 130–400)
RBC # BLD: 2.68 M/UL — LOW (ref 4.7–6.1)
RBC # FLD: 13.5 % — SIGNIFICANT CHANGE UP (ref 11.5–14.5)
WBC # BLD: 5.57 K/UL — SIGNIFICANT CHANGE UP (ref 4.8–10.8)
WBC # FLD AUTO: 5.57 K/UL — SIGNIFICANT CHANGE UP (ref 4.8–10.8)

## 2022-05-16 PROCEDURE — 93325 DOPPLER ECHO COLOR FLOW MAPG: CPT | Mod: 26

## 2022-05-16 PROCEDURE — 93312 ECHO TRANSESOPHAGEAL: CPT | Mod: 26,XU

## 2022-05-16 PROCEDURE — 93320 DOPPLER ECHO COMPLETE: CPT | Mod: 26

## 2022-05-16 RX ORDER — SODIUM BICARBONATE 1 MEQ/ML
1 SYRINGE (ML) INTRAVENOUS
Qty: 0 | Refills: 0 | DISCHARGE

## 2022-05-16 RX ORDER — MIRTAZAPINE 45 MG/1
1 TABLET, ORALLY DISINTEGRATING ORAL
Qty: 0 | Refills: 0 | DISCHARGE

## 2022-05-16 RX ORDER — ASPIRIN/CALCIUM CARB/MAGNESIUM 324 MG
1 TABLET ORAL
Qty: 0 | Refills: 0 | DISCHARGE

## 2022-05-16 RX ORDER — ATORVASTATIN CALCIUM 80 MG/1
1 TABLET, FILM COATED ORAL
Qty: 0 | Refills: 0 | DISCHARGE

## 2022-05-16 NOTE — H&P CARDIOLOGY - HISTORY OF PRESENT ILLNESS
HPI    REVIEW OF SYSTEMS:  CONSTITUTIONAL: No weakness, fevers or chills  EYES/ENT: No visual changes;  No vertigo or throat pain   NECK: No pain or stiffness  RESPIRATORY: No cough, wheezing, hemoptysis; SEE HPI  CARDIOVASCULAR: SEE HPI  GASTROINTESTINAL: No abdominal or epigastric pain. No nausea, vomiting, or hematemesis; No diarrhea or constipation. No melena or hematochezia.  GENITOURINARY: No dysuria, frequency or hematuria  NEUROLOGICAL: No numbness or weakness  SKIN: No itching, rashes      PHYSICAL EXAM:  T(C): --  HR: --  BP: --  RR: --  SpO2: --  GENERAL: NAD  HEAD:  Atraumatic, Normocephalic  EYES: conjunctiva and sclera clear  NECK: No JVD  CHEST/LUNG: Clear to auscultation bilaterally; No wheeze  HEART: Regular rate and rhythm; No murmurs  ABDOMEN: Soft, Nontender, Nondistended; Bowel sounds present  EXTREMITIES:  2+ Peripheral Pulses, No clubbing, cyanosis, or edema  NEUROLOGY:  A&Ox3, appropriate  SKIN: No rashes or lesions   HPI  85 yo male patient with PMHx of pAF, tachybrady syndrome, CAD?, colon ca, DL, CKD, s/p watchman 3/29/22, presenting for repeat MARIO post watchman for monitoring of any residual leak    REVIEW OF SYSTEMS:  CONSTITUTIONAL: No weakness, fevers or chills  EYES/ENT: No visual changes;  No vertigo or throat pain   NECK: No pain or stiffness  RESPIRATORY: No cough, wheezing, hemoptysis; SEE HPI  CARDIOVASCULAR: SEE HPI  GASTROINTESTINAL: No abdominal or epigastric pain. No nausea, vomiting, or hematemesis; No diarrhea or constipation. No melena or hematochezia.  GENITOURINARY: No dysuria, frequency or hematuria  NEUROLOGICAL: No numbness or weakness  SKIN: No itching, rashes      PHYSICAL EXAM:  GENERAL: NAD  HEAD:  Atraumatic, Normocephalic  EYES: conjunctiva and sclera clear  NECK: No JVD  CHEST/LUNG: Clear to auscultation bilaterally; No wheeze  HEART: Irregular rate and rhythm; No murmurs  ABDOMEN: Soft, Nontender, Nondistended; Bowel sounds present  EXTREMITIES:  2+ Peripheral Pulses, No clubbing, cyanosis, or edema  NEUROLOGY:  A&Ox3, appropriate  SKIN: No rashes or lesions   HPI  85 yo male patient with PMHx of pAF, tachybrady syndrome, CAD?, colon ca, DL, CKD, s/p watchman 3/29/22, presenting for repeat MARIO post watchman for evaluation.      ROS:  CONSTITUTIONAL: No weakness, fevers or chills  EYES/ENT: No visual changes;  No vertigo or throat pain   NECK: No pain or stiffness  RESPIRATORY: No cough, wheezing, hemoptysis; SEE HPI  CARDIOVASCULAR: SEE HPI  GASTROINTESTINAL: No abdominal or epigastric pain. No nausea, vomiting, or hematemesis; No diarrhea or constipation. No melena or hematochezia.  GENITOURINARY: No dysuria, frequency or hematuria  NEUROLOGICAL: No numbness or weakness  SKIN: No itching, rashes      PHYSICAL EXAM:  GENERAL: NAD  HEAD:  Atraumatic, Normocephalic  EYES: conjunctiva and sclera clear  NECK: No JVD  CHEST/LUNG: Clear to auscultation bilaterally; No wheeze  HEART: Irregular rate and rhythm; No murmurs  ABDOMEN: Soft, Nontender, Nondistended; Bowel sounds present  EXTREMITIES:  2+ Peripheral Pulses, No clubbing, cyanosis, or edema  NEUROLOGY:  A&Ox3, appropriate  SKIN: No rashes or lesions

## 2022-05-16 NOTE — ASU PATIENT PROFILE, ADULT - PRO INTERPRETER NEED 2
REPORT OF CONTINUOUS VIDEO EEG   [facility]: [address], [place] [address], Ph#: [phone] [facility]: [phone] [place] [address], [place] [address], Ph#: [phone] Office: [address] [place] [address] Ph#: [phone]  Patient Name: Dilan Alanis   Age: 67 year, : 1954 MRN #: -, Combs: -8 ICU BED #7 Referring Physician: -  Study Date: 2022   Start Time: 0800    End Date:  22        End Time: 13:00 Study Duration: ~5H  Study Information:  EEG Recording Technique: The patient underwent continuous Video-EEG monitoring, using Telemetry System hardware on the XLTek Digital System. EEG and video data were stored on a computer hard drive with important events saved in digital archive files. The material was reviewed by a physician (electroencephalographer / epileptologist) on a daily basis. Logan and seizure detection algorithms were utilized and reviewed. An EEG Technician attended to the patient, and was available throughout daytime work hours.  The epilepsy center neurologist was available in person or on call 24-hours per day.  EEG Placement and Labeling of Electrodes: The EEG was performed utilizing 20 channel referential EEG connections (coronal over temporal over parasagittal montage) using all standard 10-20 electrode placements with EKG, with additional electrodes placed in the inferior temporal region using the modified 10-10 montage electrode placements for elective admissions, or if deemed necessary. Recording was at a sampling rate of 256 samples per second per channel. Time synchronized digital video recording was done simultaneously with EEG recording. A low light infrared camera was used for low light recording.   History:  VEEG performed at [facility] : PT in semi coma / sedated No HV due to covid protcol No photic performed 66 Y/O Male PMH of lumbar herniated disc, spinal stenosis, back pain, myocardial infarction, HPT, GERD, sacroilitis, femur fracture on the right side, cerebral aneurysm rupture, osteoarthritis, narcotic dependence Presented for seizure evaluation   Medication VIMPAT IVPB KEPPRA IVPB LEVOPHED DIPRIVAN  Interpretation:  [[[Abbreviation Key:  PDR=alpha rhythm/posterior dominant rhythm. A-P=anterior posterior gradient.  Amplitude: ‘very low’:<20; ‘low’:20-50; ‘medium’:; ‘high’:>200uV.  Persistence for periodic/rhythmic patterns (% of epoch) ‘rare’:<1%; ‘occasional’:1-10%; ‘frequent’:10-50%; ‘abundant’:50-90%; ‘continuous’:>90%.  Persistence for sporadic discharges: ‘rare’:<1/hr; ‘occasional’:1/min-1/hr; ‘frequent’:>1/min; ‘abundant’:>1/10 sec.  GRDA=generalized rhythmic delta activity; FIRDA=frontal intermittent GRDA; LRDA=lateralized rhythmic delta activity; TIRDA=temporal intermittent rhythmic delta activity;  LPD=PLED=lateralized periodic discharges; GPD=generalized periodic discharges; BiPDs=BiPLEDs=bilateral independent periodic epileptiform discharges; SIRPID=stimulus induced rhythmic, periodic, or ictal appearing discharges; BIRDs=brief potentially ictal rhythmic discharges >4 Hz, lasting .5-10s; PFA (paroxysmal bursts >13 Hz or =8 Hz).  Modifiers: +F=with fast component; +S=with spike component; +R=with rhythmic component.  S-B=burst suppression pattern.  Max=maximal. N1-drowsy; N2-stage II sleep; N3-slow wave sleep. SSS/BETS=small sharp spikes/benign epileptiform transients of sleep. HV=hyperventilation; PS=photic stimulation]]]  Daily EEG Visual Analysis  FINDINGS: The background was continuous, spontaneously variable and reactive. During wakefulness, the posterior dominant rhythm was not recorded.  Background Slowing: Generalized slowing: diffuse theta/delta slowing Focal Slowing: none were present  Sleep Background: Drowsiness was characterized by fragmentation, attenuation, and slowing of the background activity.   Stage II sleep transients were not recorded.  Other Non-Epileptiform Findings: Breach effect over the right frontotemporal region characterized by higher amplitudes.  Interictal Epileptiform Activity:  Abundant sharp wave discharges over the right frontotemporal region (F4/F8), frequently occurring as brief to long runs of fluctuating 0.5-2 hz lateralized periodic discharge with rhythmic activity (LPD+R).   Events: Clinical events: None recorded. Seizures: None recorded.  Activation Procedures:  Hyperventilation was not performed.   Photic stimulation was not performed.  Artifacts: Intermittent myogenic and movement artifacts were noted.  ECG: The heart rate on single channel ECG was predominantly between  BPM.  EEG Summary / Classification:  Abnormal EEG in the encephalopathic patient. -Abundant sharp wave discharges over the right frontotemporal region (F4/F8), frequently occurring as brief to long runs of fluctuating 0.5-2 hz lateralized periodic discharge with rhythmic activity (LPD+R). -Moderate generalized slowing -Breach effect over the right frontotemporal region  EEG Impression / Clinical Correlate:  High risk for seizures from the right frontotemporal region. Moderate multifocal/diffuse nonspecific cerebral dysfunction. Skull defect over the right frontotemporal region. No seizure seen.   Cleve Prince MD EEG/Epilepsy Attending   Reading Room: [phone] On Call Service After Hours: [phone]  English

## 2022-05-16 NOTE — ASU PATIENT PROFILE, ADULT - FALL HARM RISK - HARM RISK INTERVENTIONS

## 2022-05-16 NOTE — ASU PATIENT PROFILE, ADULT - FALL HARM RISK - PT AGE POPULATION HIDDEN
ONCOLOGY/HEMATOLOGY OFFICE FOLLOW-UP    CHIEF COMPLAINT:   Mr. Jernigan returned for follow-up evaluation and management of leukopenia.    HISTORY OF PRESENT ILLNESS:   Ravi Jernigan is a pleasant 32 year old male non-smoker with hyperlipidemia, degenerative disc disease status post left L5-S1 microdiscectomy and chronic gastrointestinal complaints (nausea, diarrhea, dyspepsia) who was noted to have low white blood cell (WBC) levels.    Chart review shows the earliest available baseline CBC on 1/14/14 was normal including WBC count of 5.7 k/mm3 with a normal differential. On 4/27/18, WBC count was slightly low at 4.1 k/mm3 (ref: 4.2 to 11.0 k/mm3) but it normalized on 1/8/19 (5.2 k/mm3) and 6/13/19 (4.4 k/mm3). On 8/16/19, WBC count fell to 3.5 k/mm3, and on 8/23/19 it was 3.1 k/mm3. The differential remained completely normal, so the ANC count fell to 1.4 k/mm3. Hemoglobin, hematocrit, MCV, and platelet counts also remained normal.    The patient developed 40- to 50-pound unintentional weight loss from 2017 to 2019 associated with chronic nausea, heartburn, insomnia, anorexia, and anxiety. Esophagogastroduodenoscopy and colonoscopy on 7/2/18 showed small non-bleeding internal hemorrhoids with duodenal mucosal biopsy showing a preserved villous architecture and mildly increased intraepithelial lymphocytes and random biopsies from the stomach and colon showing no significant histopathology. CT scan of the abdomen and pelvis with contrast on 4/8/19 showed pancolonic mild circumferential wall thickening amidst multiple decompressed portions of colon and rectum. Chest CT scan with contrast on 7/22/19 was normal. Non-contrast head CT scan on 8/19/19 showed mild paranasal sinus disease with no acute intracranial abnormality. Repeat esophagogastroduodenoscopy and colonoscopy on 7/1/19 showed benign mucosa in the duodenum, stomach, terminal ileum, and colon without significant pathologic findings. He underwent robotic  bilateral inguinal herniarraphy on 8/29/19 by Dr. Denise Powell with no complications.    WBC was 3.3 and ANC was 1.6 on 9/19/19. Laboratory workup on 9/19/19 was remarkable for low serum IgA level of 60 (ref: ) with normal IgG, IgM, tissue transglutaminase antibodies, EBV IgM, CMV IgM, HIV, vitamin B12, methylmalonic acid, folate, NANCY, and ANCA.     The patient presents for an office follow-up feeling well overall. ECOG performance status is 1. Body weight is stable. Since last seen he has had chronic gastrointestinal complaints attributed to irritable bowel syndrome.  He denies any bleeding diathesis including hematochezia, melena, hematuria, epistaxis, hematemesis, hemoptysis, or gingival bleeding. He also denies any recent fever, chills, sweats, unintentional weight loss, chest pain, shortness of breath, dyspnea on exertion, dizziness, lightheadedness, palpitations, focal weakness, vision change, or confusion. There is no cough, sputum production, nausea, vomiting, diarrhea, or constipation.    Past medical history, family history, social history, allergies and medications have all been reviewed as documented in the Epic system, and I agree with them and are updated.     REVIEW OF SYSTEMS:   Apart from that described in the review of systems as above in the History of Present Illness, the remainder of the review of systems is documented in the Epic system, and I agree with them.     PHYSICAL EXAMINATION:   Vitals:    Visit Vitals  BP 98/64   Pulse 77   Temp 97.7 °F (36.5 °C) (Oral)   Wt 57.8 kg   SpO2 99%   BMI 17.94 kg/m²      Constitutional: Well developed, well nourished, no acute distress, non-toxic appearance.  Eyes: Anicteric sclerae, conjunctivae normal.  HENT: Atraumatic, oropharynx moist, no pharyngeal exudates.   Neck: Supple. No palpable adenopathy in the neck. No subcutaneous nodules.  No thyroid enlargement.  Respiratory: No respiratory distress, normal breath sounds, no rales, no wheezing.  No accessory muscles of respiration.   Cardiovascular: Normal rate, normal rhythm, no murmurs, no gallops, no rubs.  GI: Soft, nondistended, normal bowel sounds, nontender, no hepatosplenomegaly, no mass, no rebound, no guarding.  : No costovertebral angle tenderness.  Musculoskeletal: No tenderness, no deformities.   Back- no tenderness.  Lymphatic: No adenopathy in the supraclavicular, axillary or inguinal regions.   Neurologic: Alert and oriented x 3, sensation intact, no focal motor deficits.  Extremities: No pretibial or ankle edema.  Psychiatric: Speech and behavior appropriate. Affect was anxious.   Skin/Mucosa: Examination of skin and mucosa reveal no evidence of rash or petechiae or subcutaneous nodules.          LABORATORY STUDIES:  Recent Labs   Lab 09/19/19  1226 08/23/19  1001 08/16/19  0810 06/13/19  1445 01/08/19  1508   WBC 3.3* 3.1* 3.5* 4.4 5.2   RBC 4.51 4.58 4.67 4.73 4.80   HGB 13.4 13.9 13.8 14.1 14.0   HCT 39.8 40.3 41.6 40.6 42.4   MCV 88.2 88.0 89.1 85.8 88.3    149 164 162 211   Absolute Neutrophil 1.6* 1.4*  --  2.5 3.0   Absolute Lymph 1.2 1.2  --  1.5 1.6   Absolute Mono 0.3 0.3  --  0.3 0.4   Absolute Eos 0.1 0.1  --  0.1 0.1   Absolute Baso 0.0 0.0  --  0.0 0.0     Recent Labs   Lab 09/19/19  1226 08/16/19  0810 06/13/19  1445 03/07/19  0921   Glucose 93 95 121* 92   Sodium 142 142 141 138   Potassium 4.1 4.3 4.0 4.2   Chloride 101 104 104 104   BUN 12 14 22* 14   Creatinine 0.81 0.86 0.91 0.90   CALCIUM 9.3 10.4* 10.2 9.3   TOTAL PROTEIN 8.1 7.6 8.0 7.8   Albumin 4.5 4.6 4.8 4.6   AST/SGOT 11 7 10 8   ALK PHOSPHATASE 52 51 49 57   ALT/SGPT 19 22 24 19     Recent Labs   Lab 09/19/19  1226 08/16/19  0810 06/13/19  1445 03/07/19  0921   Anion Gap 16 14 8* 8*   GLOBULIN 3.6 3.0 3.2 3.2   TOTAL BILIRUBIN 0.6 0.7 0.6 0.4       RADIOLOGICAL DATA REVIEWED:   Ct Chest W Contrast  Result Date: 7/23/2019  IMPRESSION: Normal chest CT.     Ct Head Wo Contrast  Result Date:  8/19/2019  IMPRESSION:  1.  No acute intracranial abnormality. 2.  Mild paranasal sinus disease.       ASSESSMENT:  Ravi Jernigan is a 32 year old male with the following problems and, after discussion with the patient, we have agreed upon the following plan:    1. 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 Hyperlipidemia, degenerative disc disease status post left L5-S1 microdiscectomy, and chronic gastrointestinal complaints (nausea, diarrhea, dyspepsia)   - Esophagogastroduodenoscopy and colonoscopy on 7/2/18 showed small non-bleeding internal hemorrhoids with duodenal mucosal biopsy showing a preserved villous architecture and mildly increased intraepithelial lymphocytes and random biopsies from the stomach and colon showing no significant histopathology  - CT scan of the abdomen and pelvis with contrast on 4/8/19 showed pancolonic mild circumferential wall thickening amidst multiple decompressed portions of colon and rectum  - Chest CT scan with contrast on 7/22/19 was normal  - Non-contrast  head CT scan on 8/19/19 showed mild paranasal sinus disease with no acute intracranial abnormality  - Esophagogastroduodenoscopy and colonoscopy on 7/1/19 showed benign mucosa in the duodenum, stomach, terminal ileum, and colon without significant pathologic findings   - Status post robotic bilateral inguinal herniarraphy on 8/29/19   - Working diagnosis of irritable bowel syndrome      PLAN:  1. Labs and imaging were personally reviewed with the patient.  2. Discussed the various etiologies, natural history, clinical significance, and management options for leukopenia. The likely differential includes:  - chronic (non-immune) idiopathic neutropenia (BANG), which is benign and features a normal bone marrow, no evidence of autoimmune disease, and unclear pathophysiology.  - cyclical neutropenia  - less likely congenital, given previous normal values.   - no known culprit medications (anti-thyroid medications, antibiotics, anti-convulsants).   3. Could consider a referral to Allergy and Immunology for low IgA  4. If he develops arthralgias will check RF and CCP. If that workup suggests rheumatoid arthritis, would recommend workup in Rheumatology.  5. If WBC count worsens significantly, or he develops other derangements in the hemogram such as anemia or thrombocytopenia, would pursue bone marrow aspirate and core biopsy.  6. Would not administer G-CSF (such as [id number]) unless critically ill from serious recurrent infection.   7. Encouraged to optimize mental/emotional health with mediation, yoga, exercise, and counseling as indicated.    8. Return for follow-up with lab (ordered CBC) on 12/4/19      Counseling and coordination:    I have discussed my findings and further recommendations with the patient and answered all questions and he has verbalized understanding and is in agreement. I spent more 20 minutes in face-to-face evaluation with the majority of the time being counseling and coordination, review of  data and answering questions and discussions.      Thank you, Dr. Sandhu, for allowing me to participate in the care of this patient. Please do not hesitate to page me at [phone] with any questions or concerns.    Kirk High MD   Adult

## 2022-05-17 ENCOUNTER — NON-APPOINTMENT (OUTPATIENT)
Age: 87
End: 2022-05-17

## 2022-05-17 DIAGNOSIS — Z95.818 PRESENCE OF OTHER CARDIAC IMPLANTS AND GRAFTS: ICD-10-CM

## 2022-05-17 RX ORDER — CLOPIDOGREL BISULFATE 75 MG/1
75 TABLET, FILM COATED ORAL DAILY
Qty: 90 | Refills: 2 | Status: ACTIVE | COMMUNITY
Start: 2022-05-17 | End: 1900-01-01

## 2022-05-17 RX ORDER — RIVAROXABAN 15 MG/1
15 TABLET, FILM COATED ORAL
Qty: 30 | Refills: 4 | Status: DISCONTINUED | COMMUNITY
Start: 2022-02-15 | End: 2022-05-17

## 2022-05-17 RX ORDER — ASPIRIN ENTERIC COATED TABLETS 81 MG 81 MG/1
81 TABLET, DELAYED RELEASE ORAL
Qty: 90 | Refills: 1 | Status: ACTIVE | COMMUNITY
Start: 2022-05-17 | End: 1900-01-01

## 2022-07-30 PROBLEM — I10 ESSENTIAL (PRIMARY) HYPERTENSION: Status: ACTIVE | Noted: 2022-04-03

## 2022-07-30 PROBLEM — E78.00 HYPERCHOLESTEROLEMIA: Status: ACTIVE | Noted: 2021-02-12

## 2022-07-30 PROBLEM — I48.0 PAROXYSMAL ATRIAL FIBRILLATION: Status: ACTIVE | Noted: 2022-04-03

## 2022-07-30 PROBLEM — I25.10 CAD S/P PERCUTANEOUS CORONARY ANGIOPLASTY: Status: ACTIVE | Noted: 2022-04-03

## 2022-07-30 NOTE — CARDIOLOGY SUMMARY
[de-identified] : (5/9/2022) ECG: sinus rhythm at 77 bpm, PVC, non-sp T wave abnormalities\par (2/22/2022) ECG: sinus rhythm at 78 bpm, frequent APCs [de-identified] : EVENT MONITOR/MCOT RESULT SUMMARY:\par (see scanned report from tracings)\par \par Dates: 12/9/2021 to 01/07/2021\par Duration: 28 days\par Average heart rate (bpm): 77\par Range heart rate (bpm): \par Tachyarrhythmias: Atrial Fibrillation 65%, no sustained SVT, no sustained VT\par Bradyarrhythmias: no pauses, no significant bradycardia\par PVC burden: 1%\par APC burden: 11%\par \par SUMMARY: Atrial Fibrillation with burden 65%, no pauses, bradycardia at night. NSVT at 120-130 bpm

## 2022-07-30 NOTE — REASON FOR VISIT
[Arrhythmia/ECG Abnorrmalities] : arrhythmia/ECG abnormalities [FreeTextEntry3] : Dr. Popeye Navarro [FreeTextEntry1] : [place] [facility] [phone]

## 2022-07-30 NOTE — DISCUSSION/SUMMARY
[FreeTextEntry1] : Mr. Constantino Schultz is a pleasant 86 year-old man with hypertension, hyperlipidemia, paroxysmal atrial fibrillation (previously stopped xarelto by PCP due to anemia and unsteady gait), CAD s/p PCI LAD 11/29/2017, Colon cancer, anemia and unsteady gait. He underwent successful implant of Watchman FLX 27 mm on 3/29/2022. \par \par I recommend to continue same medications.\par \par I will plan MARIO around 5/10/2022; If MARIO shows complete seal, patient will take Aspirin 162 mg and Plavix 75 mg till 11/10/2022 then Aspirin 325 mg indefinitely.\par \par Groin healed well. no complications.\par \par I discussed with patient plan of care in great details. I answered all his questions to his satisfaction. Patient was pleased with the visit.\par \par Patient will follow with me in 2 months’ time. Please do not hesitate to contact me at [phone] if you have any further questions regarding this patient care.\par \par \par

## 2022-07-30 NOTE — HISTORY OF PRESENT ILLNESS
[FreeTextEntry1] : AFib (stopped xarelto by PCP due to anemia and unsteady gait), diagnosed in 2011, evaluated for Watchman in 2019 at [facility] due to Anemia and unsteady gait\par CAD s/p PCI LAD 11/29/2017, Colon cancer, Hyperlipidemia, hypertension\par 3/29/2022 Watchman FLX 27 mm implanted\par He has no chest pain, no shortness of breath, no dyspnea on exertion, no orthopnea, no PND. He denies dizziness, lightheadedness and syncope. limited exercise capacity. \par here for follow-up after Left Atrial Appendage Closure- Watchman Device

## 2022-11-08 ENCOUNTER — RX RENEWAL (OUTPATIENT)
Age: 87
End: 2022-11-08

## 2022-11-14 ENCOUNTER — APPOINTMENT (OUTPATIENT)
Dept: CARDIOLOGY | Facility: CLINIC | Age: 87
End: 2022-11-14

## 2022-11-22 ENCOUNTER — APPOINTMENT (OUTPATIENT)
Dept: CARDIOLOGY | Facility: CLINIC | Age: 87
End: 2022-11-22

## 2023-01-23 NOTE — PROGRESS NOTE ADULT - SUBJECTIVE AND OBJECTIVE BOX
SUBJECTIVE:  HPI:  86 year old Male with Past Medical History of CAD s/p PCI, HLD, HTN, ?colon cancer s/p resection, ureteral stents, Chronic Afib on Xarelto presented with bradycardia and tachycardia episodes during routine Colonoscopy.     Patient had a colonoscopy at ambulatory office earlier today and was noted to have bradycardia to 40s and tachycardia to 150s during the perioperative period after anesthesia. Patient did not have any complications during the procedure. Reports no chest pain, abdominal pain, palpitations, nausea, vomiting, diarrhea, constipation, dysuria or any other complaints.    In ED patient hemodynamically stable, afebrile, EP consulted and recommended monitoring on tele for possible PPM in case any new event happens. (08 Dec 2021 15:52)      PAST MEDICAL & SURGICAL HISTORY  PAST MEDICAL & SURGICAL HISTORY:  Hyperlipidemia    Hypertension    Coronary artery disease    S/P coronary artery stent placement    History of appendectomy    Status post colectomy      SOCIAL HISTORY:    ALLERGIES:  No Known Allergies    MEDICATIONS:  STANDING MEDICATIONS  aspirin  chewable 81 milliGRAM(s) Oral daily  atorvastatin 40 milliGRAM(s) Oral at bedtime  losartan 25 milliGRAM(s) Oral daily  mirtazapine 15 milliGRAM(s) Oral daily  rivaroxaban 15 milliGRAM(s) Oral with dinner  tamsulosin 0.4 milliGRAM(s) Oral at bedtime    PRN MEDICATIONS  acetaminophen     Tablet .. 650 milliGRAM(s) Oral every 6 hours PRN  aluminum hydroxide/magnesium hydroxide/simethicone Suspension 30 milliLiter(s) Oral every 4 hours PRN  melatonin 3 milliGRAM(s) Oral at bedtime PRN  ondansetron Injectable 4 milliGRAM(s) IV Push every 8 hours PRN    VITALS:   T(F): 97.5  HR: 91  BP: 165/78  RR: 18  SpO2: 99%    LABS:                        9.3    5.64  )-----------( 144      ( 09 Dec 2021 04:30 )             29.8     12-09    140  |  104  |  26<H>  ----------------------------<  81  4.5   |  18  |  1.4    Ca    9.3      09 Dec 2021 04:30  Mg     2.1     12-08    TPro  6.4  /  Alb  3.8  /  TBili  0.5  /  DBili  x   /  AST  22  /  ALT  11  /  AlkPhos  107  12-09    PT/INR - ( 09 Dec 2021 04:30 )   PT: 21.00 sec;   INR: 1.84 ratio         PTT - ( 09 Dec 2021 04:30 )  PTT:36.2 sec      Troponin T, Serum: <0.01 ng/mL (12-08-21 @ 13:17)      CARDIAC MARKERS ( 08 Dec 2021 13:17 )  x     / <0.01 ng/mL / x     / x     / x          RADIOLOGY:    PHYSICAL EXAM:  GEN: No acute distress  HEENT: AT/NC PEERLA, EOMI  LUNGS: Clear to auscultation bilaterally  HEART: S1/S2 present  ABD: Soft, non-tender, non-distended. Bowel sounds present in all 4 quadrants  EXT: No edema, no rashes, no cyanosis  NEURO: AAOX3 Stable

## 2023-01-27 NOTE — H&P PST ADULT - TEACHING/LEARNING FACTORS INFLUENCE READINESS TO LEARN
[Dear  ___] : Dear  [unfilled], [Courtesy Letter:] : I had the pleasure of seeing your patient, [unfilled], in my office today. [Please see my note below.] : Please see my note below. [Consult Closing:] : Thank you very much for allowing me to participate in the care of this patient.  If you have any questions, please do not hesitate to contact me. [Sincerely,] : Sincerely, [FreeTextEntry3] : Terrell Valle, DO none

## 2023-07-05 NOTE — ED PROVIDER NOTE - MDM ORDERS SUBMITTED SELECTION
Pt calling stating you [facility] but they require ref Labs/EKG

## 2023-09-19 ENCOUNTER — INPATIENT (INPATIENT)
Facility: HOSPITAL | Age: 88
LOS: 1 days | Discharge: HOME HEALTH | DRG: 313 | End: 2023-09-21
Attending: INTERNAL MEDICINE | Admitting: STUDENT IN AN ORGANIZED HEALTH CARE EDUCATION/TRAINING PROGRAM
Payer: MEDICARE

## 2023-09-19 VITALS
HEART RATE: 65 BPM | WEIGHT: 119.93 LBS | OXYGEN SATURATION: 99 % | DIASTOLIC BLOOD PRESSURE: 81 MMHG | RESPIRATION RATE: 20 BRPM | TEMPERATURE: 98 F | SYSTOLIC BLOOD PRESSURE: 162 MMHG

## 2023-09-19 DIAGNOSIS — I25.9 CHRONIC ISCHEMIC HEART DISEASE, UNSPECIFIED: ICD-10-CM

## 2023-09-19 DIAGNOSIS — Z90.49 ACQUIRED ABSENCE OF OTHER SPECIFIED PARTS OF DIGESTIVE TRACT: Chronic | ICD-10-CM

## 2023-09-19 DIAGNOSIS — Z98.890 OTHER SPECIFIED POSTPROCEDURAL STATES: Chronic | ICD-10-CM

## 2023-09-19 LAB
ALBUMIN SERPL ELPH-MCNC: 4 G/DL — SIGNIFICANT CHANGE UP (ref 3.5–5.2)
ALP SERPL-CCNC: 105 U/L — SIGNIFICANT CHANGE UP (ref 30–115)
ALT FLD-CCNC: 13 U/L — SIGNIFICANT CHANGE UP (ref 0–41)
ANION GAP SERPL CALC-SCNC: 10 MMOL/L — SIGNIFICANT CHANGE UP (ref 7–14)
AST SERPL-CCNC: 18 U/L — SIGNIFICANT CHANGE UP (ref 0–41)
BASOPHILS # BLD AUTO: 0.02 K/UL — SIGNIFICANT CHANGE UP (ref 0–0.2)
BASOPHILS NFR BLD AUTO: 0.3 % — SIGNIFICANT CHANGE UP (ref 0–1)
BILIRUB SERPL-MCNC: 0.2 MG/DL — SIGNIFICANT CHANGE UP (ref 0.2–1.2)
BUN SERPL-MCNC: 41 MG/DL — HIGH (ref 10–20)
CALCIUM SERPL-MCNC: 9.9 MG/DL — SIGNIFICANT CHANGE UP (ref 8.4–10.4)
CHLORIDE SERPL-SCNC: 106 MMOL/L — SIGNIFICANT CHANGE UP (ref 98–110)
CO2 SERPL-SCNC: 27 MMOL/L — SIGNIFICANT CHANGE UP (ref 17–32)
CREAT SERPL-MCNC: 1.7 MG/DL — HIGH (ref 0.7–1.5)
EGFR: 38 ML/MIN/1.73M2 — LOW
EOSINOPHIL # BLD AUTO: 0.02 K/UL — SIGNIFICANT CHANGE UP (ref 0–0.7)
EOSINOPHIL NFR BLD AUTO: 0.3 % — SIGNIFICANT CHANGE UP (ref 0–8)
GAS PNL BLDV: SIGNIFICANT CHANGE UP
GLUCOSE SERPL-MCNC: 106 MG/DL — HIGH (ref 70–99)
HCT VFR BLD CALC: 28.1 % — LOW (ref 42–52)
HGB BLD-MCNC: 8.6 G/DL — LOW (ref 14–18)
IMM GRANULOCYTES NFR BLD AUTO: 0.4 % — HIGH (ref 0.1–0.3)
LIDOCAIN IGE QN: 36 U/L — SIGNIFICANT CHANGE UP (ref 7–60)
LYMPHOCYTES # BLD AUTO: 1.17 K/UL — LOW (ref 1.2–3.4)
LYMPHOCYTES # BLD AUTO: 17.1 % — LOW (ref 20.5–51.1)
MCHC RBC-ENTMCNC: 25.2 PG — LOW (ref 27–31)
MCHC RBC-ENTMCNC: 30.6 G/DL — LOW (ref 32–37)
MCV RBC AUTO: 82.4 FL — SIGNIFICANT CHANGE UP (ref 80–94)
MONOCYTES # BLD AUTO: 0.36 K/UL — SIGNIFICANT CHANGE UP (ref 0.1–0.6)
MONOCYTES NFR BLD AUTO: 5.3 % — SIGNIFICANT CHANGE UP (ref 1.7–9.3)
NEUTROPHILS # BLD AUTO: 5.25 K/UL — SIGNIFICANT CHANGE UP (ref 1.4–6.5)
NEUTROPHILS NFR BLD AUTO: 76.6 % — HIGH (ref 42.2–75.2)
NRBC # BLD: 0 /100 WBCS — SIGNIFICANT CHANGE UP (ref 0–0)
NT-PROBNP SERPL-SCNC: 1572 PG/ML — HIGH (ref 0–300)
PLATELET # BLD AUTO: 198 K/UL — SIGNIFICANT CHANGE UP (ref 130–400)
PMV BLD: 9.4 FL — SIGNIFICANT CHANGE UP (ref 7.4–10.4)
POTASSIUM SERPL-MCNC: 5.3 MMOL/L — HIGH (ref 3.5–5)
POTASSIUM SERPL-SCNC: 5.3 MMOL/L — HIGH (ref 3.5–5)
PROT SERPL-MCNC: 7.1 G/DL — SIGNIFICANT CHANGE UP (ref 6–8)
RBC # BLD: 3.41 M/UL — LOW (ref 4.7–6.1)
RBC # FLD: 16.4 % — HIGH (ref 11.5–14.5)
SODIUM SERPL-SCNC: 143 MMOL/L — SIGNIFICANT CHANGE UP (ref 135–146)
TROPONIN T SERPL-MCNC: <0.01 NG/ML — SIGNIFICANT CHANGE UP
WBC # BLD: 6.85 K/UL — SIGNIFICANT CHANGE UP (ref 4.8–10.8)
WBC # FLD AUTO: 6.85 K/UL — SIGNIFICANT CHANGE UP (ref 4.8–10.8)

## 2023-09-19 PROCEDURE — 86850 RBC ANTIBODY SCREEN: CPT

## 2023-09-19 PROCEDURE — 86901 BLOOD TYPING SEROLOGIC RH(D): CPT

## 2023-09-19 PROCEDURE — 86900 BLOOD TYPING SEROLOGIC ABO: CPT

## 2023-09-19 PROCEDURE — 99285 EMERGENCY DEPT VISIT HI MDM: CPT | Mod: GC

## 2023-09-19 PROCEDURE — 71045 X-RAY EXAM CHEST 1 VIEW: CPT | Mod: 26

## 2023-09-19 PROCEDURE — 83735 ASSAY OF MAGNESIUM: CPT

## 2023-09-19 PROCEDURE — 85025 COMPLETE CBC W/AUTO DIFF WBC: CPT

## 2023-09-19 PROCEDURE — 80053 COMPREHEN METABOLIC PANEL: CPT

## 2023-09-19 PROCEDURE — 36415 COLL VENOUS BLD VENIPUNCTURE: CPT

## 2023-09-19 PROCEDURE — 93005 ELECTROCARDIOGRAM TRACING: CPT

## 2023-09-19 PROCEDURE — 99223 1ST HOSP IP/OBS HIGH 75: CPT

## 2023-09-19 PROCEDURE — 93010 ELECTROCARDIOGRAM REPORT: CPT

## 2023-09-19 PROCEDURE — 84484 ASSAY OF TROPONIN QUANT: CPT

## 2023-09-19 RX ORDER — MIRTAZAPINE 45 MG/1
15 TABLET, ORALLY DISINTEGRATING ORAL AT BEDTIME
Refills: 0 | Status: DISCONTINUED | OUTPATIENT
Start: 2023-09-19 | End: 2023-09-21

## 2023-09-19 RX ORDER — ATORVASTATIN CALCIUM 80 MG/1
40 TABLET, FILM COATED ORAL AT BEDTIME
Refills: 0 | Status: DISCONTINUED | OUTPATIENT
Start: 2023-09-19 | End: 2023-09-21

## 2023-09-19 RX ORDER — LOSARTAN/HYDROCHLOROTHIAZIDE 100MG-25MG
0 TABLET ORAL
Qty: 0 | Refills: 3 | DISCHARGE

## 2023-09-19 RX ORDER — HEPARIN SODIUM 5000 [USP'U]/ML
5000 INJECTION INTRAVENOUS; SUBCUTANEOUS ONCE
Refills: 0 | Status: COMPLETED | OUTPATIENT
Start: 2023-09-19 | End: 2023-09-19

## 2023-09-19 RX ORDER — SODIUM ZIRCONIUM CYCLOSILICATE 10 G/10G
10 POWDER, FOR SUSPENSION ORAL ONCE
Refills: 0 | Status: COMPLETED | OUTPATIENT
Start: 2023-09-19 | End: 2023-09-19

## 2023-09-19 RX ORDER — TAMSULOSIN HYDROCHLORIDE 0.4 MG/1
0.4 CAPSULE ORAL AT BEDTIME
Refills: 0 | Status: DISCONTINUED | OUTPATIENT
Start: 2023-09-19 | End: 2023-09-21

## 2023-09-19 RX ORDER — SODIUM BICARBONATE 1 MEQ/ML
650 SYRINGE (ML) INTRAVENOUS
Refills: 0 | Status: DISCONTINUED | OUTPATIENT
Start: 2023-09-19 | End: 2023-09-21

## 2023-09-19 RX ORDER — ASPIRIN/CALCIUM CARB/MAGNESIUM 324 MG
81 TABLET ORAL DAILY
Refills: 0 | Status: DISCONTINUED | OUTPATIENT
Start: 2023-09-20 | End: 2023-09-21

## 2023-09-19 RX ORDER — METOPROLOL TARTRATE 50 MG
25 TABLET ORAL
Refills: 0 | Status: DISCONTINUED | OUTPATIENT
Start: 2023-09-19 | End: 2023-09-20

## 2023-09-19 RX ADMIN — SODIUM ZIRCONIUM CYCLOSILICATE 10 GRAM(S): 10 POWDER, FOR SUSPENSION ORAL at 22:07

## 2023-09-19 RX ADMIN — ATORVASTATIN CALCIUM 40 MILLIGRAM(S): 80 TABLET, FILM COATED ORAL at 22:08

## 2023-09-19 RX ADMIN — TAMSULOSIN HYDROCHLORIDE 0.4 MILLIGRAM(S): 0.4 CAPSULE ORAL at 22:07

## 2023-09-19 RX ADMIN — HEPARIN SODIUM 5000 UNIT(S): 5000 INJECTION INTRAVENOUS; SUBCUTANEOUS at 23:06

## 2023-09-19 RX ADMIN — MIRTAZAPINE 15 MILLIGRAM(S): 45 TABLET, ORALLY DISINTEGRATING ORAL at 22:08

## 2023-09-19 NOTE — H&P ADULT - ASSESSMENT
88 year old male with PMH pAF (not on anticoagulation), tachybrady syndrome, CAD, colon ca, DLD, CKD, s/p watchman 3/29/22, L nephrostomy s/p spent who presents after abnormal ekg changes at his cardiologist office (patient follows with Dr. Navarro).     #Chest pain, ischemic ekg  #CAD, paroxysmal a fib (not on anticoagulation), DLD, watchman device  -patient was by Dr. Navarro on 9/19 for preop clearance for ureteral stent exchange when he had an ekg which demonstrated ischemic changes and patient also complained of intermittent chest pain and was sent to the ED for evaluation  -in Dr. Navarro's office patient was loaded with aspirin and plavix  -trop negative x1  -EKG in ED non ischemic   -cardiology consult  -will make patient npo after midnight in the event that he goes to cath tomorrow  -ekg in AM  -obtain repeat trop    #Anemia  -per daughter Karla patient has chronic anemia  -monitor Hb    #CKD  -Cr 1.7, baseline 1.4-1.8  -continue to trend daily Cr    #L nephrostomy s/p stent  -c/w tamsulosin  -patient was planned for stent exchange on 10/4, patient has stent exchanges every 3 months and follows with urologist Dr. Josue    DVT proph: will give heparin subq x1 dose tonight

## 2023-09-19 NOTE — H&P ADULT - HISTORY OF PRESENT ILLNESS
88 year old male with PMH pAF (not on anticoagulation), tachybrady syndrome, CAD, colon ca, DLD, CKD, s/p watchman 3/29/22, L nephrostomy s/p spent who presents after abnormal ekg changes at his cardiologist office (patient follows with Dr. Navarro). Patient is A&Ox1-2 (oriented to himself and that he is in a hospital however he cannot recall which hospital), patient is confused on details of today's events and history was taken from patient's daughter Karla.  Patient gets ureteral stent exchanges every 3 months and requires cardiac clearance prior to each procedure.  Today 9/19 the patient went to Dr. Navarro's office and per patient's daughter the ekg at the office showed abnormalities and the patient was sent to the ED for evaluation.  Per the patient's aid, the patient also told Dr. Navarro that he has been having intermittent chest pain over the past few weeks.  When asked if the patient has chest pain or if he has had chest pain over the past few weeks he says no.  Patient was loaded with aspirin 325 and plavix 300 at Dr. Navarro's office.  Patient was also given metoprolol 50. Currently the patient states he feels well and has no complaints, denies chest pain, sob, nausea, vomiting, abdominal pain.    In ED vitals: /81  Labs: Hb 8.6 (baseline around 9), Cr: 1.7 (baseline 1.4-1.8), trop negative x1, BNP 1572  Chest x-ray negative for acute cardiopulmonary disease

## 2023-09-19 NOTE — ED PROVIDER NOTE - CLINICAL SUMMARY MEDICAL DECISION MAKING FREE TEXT BOX
88-year-old male past medical history of CAD, CVA CKD status post Watchman left nephrostomy presents with possible ischemia EKG changes noted Dr. Pascal's office sent in for preop clearance.  Labs reviewed labs and EKG were ordered and reviewed by me.  Imaging was ordered and reviewed by me.   Previous charts previous EKG.   Patient's records prior EKGs were reviewed.  Additional history was obtained from family. Chronic conditions affecting care -CAD status post.  Escalation to admission/observation was considered.  Patient requires inpatient hospitalization - monitored setting.

## 2023-09-19 NOTE — CONSULT NOTE ADULT - ASSESSMENT
#Intermittent, mild L Flank Pain    - Denies chest pain, Troponin nl x1, EKG initially with HR in 120s, showed ST depressions in inferior leads, resolved on subsequent EKG with normal HR    - s/p Aspirin and Plavix load in Dr. Navarro's office  #CAD s/p PCI w MADDY to pLAD 2017  #P. AFib s/p Watchman 2022  #CKD  #L Nephrostomy s/p ureteral stent pending exchange    Recommendations  - Low suspicion for acute ischemia  - Admitted to telemetry, monitor for any chest pain and repeat EKG if recurs  - Troponin neg x1, continue to trend  - C/w Aspirin, statin, Metoprolol  #Intermittent, mild L Flank Pain    - Denies chest pain, Troponin nl x1, EKG initially with HR in 120s, showed ST depressions in inferior leads, resolved on subsequent EKG with normal HR    - s/p Aspirin and Plavix load in Dr. Navarro's office  #CAD s/p PCI w MADDY to pLAD 2017  #P. AFib s/p Watchman 2022  #CKD  #L Nephrostomy s/p ureteral stent pending exchange    Recommendations  - Low suspicion for acute ischemia  - Admitted to telemetry, monitor for any chest pain and repeat EKG if recurs  - Troponin neg x1, can trend for additional 2 sets  - C/w Aspirin, statin  - Recommend to increase Metoprolol to 37.5 mg BID

## 2023-09-19 NOTE — CONSULT NOTE ADULT - ATTENDING COMMENTS
Agree with above. On admission, patient with Afib,  bpm, and STD in the inferior leads and V5-V6. When his HR is better controlled in sinus rhythm, there are no ECG changes. Patient denies chest pain. No need for ischemic work-up. Please uptitrate Lopressor to 37.5 mg BID. Cardiology consult team to sign off.

## 2023-09-19 NOTE — H&P ADULT - NSHPPHYSICALEXAM_GEN_ALL_CORE
PHYSICAL EXAM:  GENERAL: NAD, lying in bed on phone with daughter  HEAD:  Atraumatic, Normocephalic  EYES: EOMI, PERRLA, conjunctiva and sclera clear  ENMT: No tonsillar erythema, exudates, or enlargement; Moist mucous membranes  NECK: Supple, No JVD, Normal thyroid  HEART: Regular rate and rhythm; No murmurs, rubs, or gallops  RESPIRATORY: CTA B/L, No W/R/R  ABDOMEN: Soft, Nontender, Nondistended; Bowel sounds present  NEUROLOGY: A&Ox3, nonfocal, moving all extremities  EXTREMITIES:  2+ Peripheral Pulses, No clubbing, cyanosis, or edema

## 2023-09-19 NOTE — H&P ADULT - CONVERSATION DETAILS
Patient's daughter Karla is healthcare proxy.  In family discussion with Karla and patient both the patient and Karla stated that the patient is DNR.

## 2023-09-19 NOTE — H&P ADULT - ATTENDING COMMENTS
88 year old male with PMH pAF (not on anticoagulation), tachybrady syndrome, CAD, colon ca, DLD, CKD, s/p watchman 3/29/22, L nephrostomy s/p spent who presents after abnormal ekg changes at his cardiologist office (patient follows with Dr. Navarro).       Agree  with assessment  except for changes below.     ECHO 04/22   1. Watchman appendage occluder device in position. No evidence of   thrombus or residual faisal-device flow.   2. Patent foramen ovale with a small left-to-right shunt seen on color   Doppler.   3. Left ventricular ejection fraction, by visual estimation, is 60 to   65%.   4. Normal global left ventricular systolic function.   5. Mild tricuspid regurgitation.    IMPRESSION  Left Sided Pain  Described  as  Left charged pain on Cardiologist office, later described on  Left flank pain   Sent in by cardiologist for  Abnormal  ECG  s/p Aspirin and Plavix load in Dr. Navarro's office  Hx Paroxymal Afib  s/p Watchman  Seen by cardiology fellow  f/u final Recs   trop 0.01 x1   ECG :  Line Sinus rhythm with Premature atrial complexes  Trend Trops  c/w ASA, Lopressor  Statin   admit to tele   f/u Kidney Bladder US    Mild Hyperkalemia - repeat BMP, Low K Diet    Hx Chronic Normocytic Anemia   Hx  ckd4   Appears  to be  at Baseline   Continue  Monitoring  CBC H/H       RUPALI on CKD4   Cr Baseline Uncertain  1.4-2.0    Creatinine today1.7 , Renal US,  Avoid nephrotoxic agents, Monitor BUN/creatinine, Send  Urine Lytes  Gentle  Hydration      Hx L nephrostomy s/p stent  -c/w tamsulosin  - Patient was planned for stent exchange on 10/4,   - Patient has stent exchanges every 3 months and follows with urologist Dr. Josue 88 year old male with PMH pAF (not on anticoagulation), tachybrady syndrome, CAD, colon ca, DLD, CKD, s/p watchman 3/29/22, L nephrostomy s/p spent who presents after abnormal ekg changes at his cardiologist office (patient follows with Dr. Navarro).       Agree  with assessment  except for changes below.     ECHO 04/22   1. Watchman appendage occluder device in position. No evidence of   thrombus or residual faisal-device flow.   2. Patent foramen ovale with a small left-to-right shunt seen on color   Doppler.   3. Left ventricular ejection fraction, by visual estimation, is 60 to   65%.   4. Normal global left ventricular systolic function.   5. Mild tricuspid regurgitation.    IMPRESSION  Left Sided  Chest pain at  Cardiologist office, later described as  Left flank pain   Sent in by cardiologist for  Abnormal  ECG  s/p Aspirin and Plavix load in Dr. Navarro's office  Hx Paroxymal Afib  s/p Watchman  Seen by cardiology fellow  f/u final Recs   trop 0.01 x1   ECG :  Line Sinus rhythm with Premature atrial complexes  Trend Trops  c/w ASA, Lopressor  Statin   admit to tele   f/u Kidney Bladder US    Mild Hyperkalemia - repeat BMP, Low K Diet    Hx Chronic Normocytic Anemia   Hx  ckd4   Appears  to be  at Baseline   Continue  Monitoring  CBC H/H       RUPALI on CKD4   Cr Baseline Uncertain  1.4-2.0    Creatinine today1.7 , Renal US,  Avoid nephrotoxic agents, Monitor BUN/creatinine, Send  Urine Lytes  Gentle  Hydration      Hx L nephrostomy s/p stent  -c/w tamsulosin  - Patient was planned for stent exchange on 10/4,   - Patient has stent exchanges every 3 months and follows with urologist Dr. Josue 88 year old male with PMH pAF (not on anticoagulation), tachybrady syndrome, CAD, colon ca, DLD, CKD, s/p watchman 3/29/22, L nephrostomy s/p spent who presents after abnormal ekg changes at his cardiologist office (patient follows with Dr. Navarro).       Agree  with assessment  except for changes below.     ECHO 04/22   1. Watchman appendage occluder device in position. No evidence of   thrombus or residual faisal-device flow.   2. Patent foramen ovale with a small left-to-right shunt seen on color   Doppler.   3. Left ventricular ejection fraction, by visual estimation, is 60 to   65%.   4. Normal global left ventricular systolic function.   5. Mild tricuspid regurgitation.    PHYSICAL EXAM  GENERAL: NAD,  HEAD:  NCAT, EOMI, MM  NECK: Supple, Nontender  NERVOUS SYSTEM:  AAOx1-2, NFD  CHEST/LUNG: +bs b/l, No wheezing   HEART: +s1s2 RRR  ABDOMEN: soft, NT/ND  EXTREMITIES:  pp, no edema  SKIN: age related skin changes     IMPRESSION  Left Sided  Chest pain at  Cardiologist office, later described as  Left flank pain   Sent in by cardiologist for  Abnormal  ECG  s/p Aspirin and Plavix load in Dr. Navarro's office  Hx Paroxymal Afib  s/p Watchman  Seen by cardiology fellow  f/u final Recs   trop 0.01 x1   ECG :  Line Sinus rhythm with Premature atrial complexes  Trend Trops  c/w ASA, Lopressor  Statin   admit to tele   f/u Kidney Bladder US    Mild Hyperkalemia - repeat BMP, Low K Diet    Hx Chronic Normocytic Anemia   Hx  ckd4   Appears  to be  at Baseline   Continue  Monitoring  CBC H/H       RUPALI on CKD4   Cr Baseline Uncertain  1.4-2.0    Creatinine today1.7 , Renal US,  Avoid nephrotoxic agents, Monitor BUN/creatinine, Send  Urine Lytes  Gentle  Hydration      Hx L nephrostomy s/p stent  -c/w tamsulosin  - Patient was planned for stent exchange on 10/4,   - Patient has stent exchanges every 3 months and follows with urologist Dr. Josue    Seen 09/19/23

## 2023-09-19 NOTE — H&P ADULT - NSHPLABSRESULTS_GEN_ALL_CORE
8.6    6.85  )-----------( 198      ( 19 Sep 2023 17:11 )             28.1       09-19    143  |  106  |  41<H>  ----------------------------<  106<H>  5.3<H>   |  27  |  1.7<H>    Ca    9.9      19 Sep 2023 17:11    TPro  7.1  /  Alb  4.0  /  TBili  0.2  /  DBili  x   /  AST  18  /  ALT  13  /  AlkPhos  105  09-19              Urinalysis Basic - ( 19 Sep 2023 17:11 )    Color: x / Appearance: x / SG: x / pH: x  Gluc: 106 mg/dL / Ketone: x  / Bili: x / Urobili: x   Blood: x / Protein: x / Nitrite: x   Leuk Esterase: x / RBC: x / WBC x   Sq Epi: x / Non Sq Epi: x / Bacteria: x            Lactate Trend      CARDIAC MARKERS ( 19 Sep 2023 17:11 )  x     / <0.01 ng/mL / x     / x     / x            CAPILLARY BLOOD GLUCOSE

## 2023-09-20 ENCOUNTER — TRANSCRIPTION ENCOUNTER (OUTPATIENT)
Age: 88
End: 2023-09-20

## 2023-09-20 LAB
ALBUMIN SERPL ELPH-MCNC: 4.2 G/DL — SIGNIFICANT CHANGE UP (ref 3.5–5.2)
ALP SERPL-CCNC: 105 U/L — SIGNIFICANT CHANGE UP (ref 30–115)
ALT FLD-CCNC: 11 U/L — SIGNIFICANT CHANGE UP (ref 0–41)
ANION GAP SERPL CALC-SCNC: 10 MMOL/L — SIGNIFICANT CHANGE UP (ref 7–14)
AST SERPL-CCNC: 16 U/L — SIGNIFICANT CHANGE UP (ref 0–41)
BASOPHILS # BLD AUTO: 0.01 K/UL — SIGNIFICANT CHANGE UP (ref 0–0.2)
BASOPHILS NFR BLD AUTO: 0.2 % — SIGNIFICANT CHANGE UP (ref 0–1)
BILIRUB SERPL-MCNC: 0.4 MG/DL — SIGNIFICANT CHANGE UP (ref 0.2–1.2)
BLD GP AB SCN SERPL QL: SIGNIFICANT CHANGE UP
BUN SERPL-MCNC: 37 MG/DL — HIGH (ref 10–20)
CALCIUM SERPL-MCNC: 9.6 MG/DL — SIGNIFICANT CHANGE UP (ref 8.4–10.5)
CHLORIDE SERPL-SCNC: 108 MMOL/L — SIGNIFICANT CHANGE UP (ref 98–110)
CO2 SERPL-SCNC: 27 MMOL/L — SIGNIFICANT CHANGE UP (ref 17–32)
CREAT SERPL-MCNC: 1.8 MG/DL — HIGH (ref 0.7–1.5)
EGFR: 36 ML/MIN/1.73M2 — LOW
EOSINOPHIL # BLD AUTO: 0.06 K/UL — SIGNIFICANT CHANGE UP (ref 0–0.7)
EOSINOPHIL NFR BLD AUTO: 1.3 % — SIGNIFICANT CHANGE UP (ref 0–8)
GLUCOSE SERPL-MCNC: 85 MG/DL — SIGNIFICANT CHANGE UP (ref 70–99)
HCT VFR BLD CALC: 27.7 % — LOW (ref 42–52)
HGB BLD-MCNC: 8.5 G/DL — LOW (ref 14–18)
IMM GRANULOCYTES NFR BLD AUTO: 0.4 % — HIGH (ref 0.1–0.3)
LYMPHOCYTES # BLD AUTO: 0.96 K/UL — LOW (ref 1.2–3.4)
LYMPHOCYTES # BLD AUTO: 21.2 % — SIGNIFICANT CHANGE UP (ref 20.5–51.1)
MAGNESIUM SERPL-MCNC: 2.1 MG/DL — SIGNIFICANT CHANGE UP (ref 1.8–2.4)
MCHC RBC-ENTMCNC: 25.2 PG — LOW (ref 27–31)
MCHC RBC-ENTMCNC: 30.7 G/DL — LOW (ref 32–37)
MCV RBC AUTO: 82.2 FL — SIGNIFICANT CHANGE UP (ref 80–94)
MONOCYTES # BLD AUTO: 0.34 K/UL — SIGNIFICANT CHANGE UP (ref 0.1–0.6)
MONOCYTES NFR BLD AUTO: 7.5 % — SIGNIFICANT CHANGE UP (ref 1.7–9.3)
NEUTROPHILS # BLD AUTO: 3.13 K/UL — SIGNIFICANT CHANGE UP (ref 1.4–6.5)
NEUTROPHILS NFR BLD AUTO: 69.4 % — SIGNIFICANT CHANGE UP (ref 42.2–75.2)
NRBC # BLD: 0 /100 WBCS — SIGNIFICANT CHANGE UP (ref 0–0)
PLATELET # BLD AUTO: 192 K/UL — SIGNIFICANT CHANGE UP (ref 130–400)
PMV BLD: 9.1 FL — SIGNIFICANT CHANGE UP (ref 7.4–10.4)
POTASSIUM SERPL-MCNC: 4.7 MMOL/L — SIGNIFICANT CHANGE UP (ref 3.5–5)
POTASSIUM SERPL-SCNC: 4.7 MMOL/L — SIGNIFICANT CHANGE UP (ref 3.5–5)
PROT SERPL-MCNC: 6.5 G/DL — SIGNIFICANT CHANGE UP (ref 6–8)
RBC # BLD: 3.37 M/UL — LOW (ref 4.7–6.1)
RBC # FLD: 16.4 % — HIGH (ref 11.5–14.5)
SODIUM SERPL-SCNC: 145 MMOL/L — SIGNIFICANT CHANGE UP (ref 135–146)
TROPONIN T SERPL-MCNC: <0.01 NG/ML — SIGNIFICANT CHANGE UP
TROPONIN T SERPL-MCNC: <0.01 NG/ML — SIGNIFICANT CHANGE UP
WBC # BLD: 4.52 K/UL — LOW (ref 4.8–10.8)
WBC # FLD AUTO: 4.52 K/UL — LOW (ref 4.8–10.8)

## 2023-09-20 PROCEDURE — 99233 SBSQ HOSP IP/OBS HIGH 50: CPT

## 2023-09-20 PROCEDURE — 93010 ELECTROCARDIOGRAM REPORT: CPT

## 2023-09-20 PROCEDURE — 99221 1ST HOSP IP/OBS SF/LOW 40: CPT

## 2023-09-20 RX ORDER — LANOLIN ALCOHOL/MO/W.PET/CERES
5 CREAM (GRAM) TOPICAL AT BEDTIME
Refills: 0 | Status: DISCONTINUED | OUTPATIENT
Start: 2023-09-20 | End: 2023-09-21

## 2023-09-20 RX ORDER — SODIUM CHLORIDE 9 MG/ML
1000 INJECTION INTRAMUSCULAR; INTRAVENOUS; SUBCUTANEOUS
Refills: 0 | Status: DISCONTINUED | OUTPATIENT
Start: 2023-09-20 | End: 2023-09-21

## 2023-09-20 RX ORDER — METOPROLOL TARTRATE 50 MG
12.5 TABLET ORAL ONCE
Refills: 0 | Status: COMPLETED | OUTPATIENT
Start: 2023-09-20 | End: 2023-09-20

## 2023-09-20 RX ORDER — CHLORHEXIDINE GLUCONATE 213 G/1000ML
1 SOLUTION TOPICAL
Refills: 0 | Status: DISCONTINUED | OUTPATIENT
Start: 2023-09-20 | End: 2023-09-21

## 2023-09-20 RX ORDER — METOPROLOL TARTRATE 50 MG
1 TABLET ORAL
Qty: 0 | Refills: 0 | DISCHARGE
Start: 2023-09-20

## 2023-09-20 RX ORDER — METOPROLOL TARTRATE 50 MG
1 TABLET ORAL
Qty: 60 | Refills: 0
Start: 2023-09-20 | End: 2023-10-19

## 2023-09-20 RX ORDER — ACETAMINOPHEN 500 MG
650 TABLET ORAL EVERY 6 HOURS
Refills: 0 | Status: DISCONTINUED | OUTPATIENT
Start: 2023-09-20 | End: 2023-09-21

## 2023-09-20 RX ORDER — METOPROLOL TARTRATE 50 MG
37.5 TABLET ORAL
Refills: 0 | Status: DISCONTINUED | OUTPATIENT
Start: 2023-09-20 | End: 2023-09-21

## 2023-09-20 RX ADMIN — Medication 650 MILLIGRAM(S): at 17:37

## 2023-09-20 RX ADMIN — TAMSULOSIN HYDROCHLORIDE 0.4 MILLIGRAM(S): 0.4 CAPSULE ORAL at 21:33

## 2023-09-20 RX ADMIN — CHLORHEXIDINE GLUCONATE 1 APPLICATION(S): 213 SOLUTION TOPICAL at 05:47

## 2023-09-20 RX ADMIN — SODIUM CHLORIDE 75 MILLILITER(S): 9 INJECTION INTRAMUSCULAR; INTRAVENOUS; SUBCUTANEOUS at 05:57

## 2023-09-20 RX ADMIN — Medication 25 MILLIGRAM(S): at 05:57

## 2023-09-20 RX ADMIN — Medication 37.5 MILLIGRAM(S): at 17:37

## 2023-09-20 RX ADMIN — Medication 650 MILLIGRAM(S): at 05:57

## 2023-09-20 RX ADMIN — ATORVASTATIN CALCIUM 40 MILLIGRAM(S): 80 TABLET, FILM COATED ORAL at 21:33

## 2023-09-20 RX ADMIN — Medication 12.5 MILLIGRAM(S): at 11:01

## 2023-09-20 RX ADMIN — Medication 81 MILLIGRAM(S): at 11:37

## 2023-09-20 RX ADMIN — MIRTAZAPINE 15 MILLIGRAM(S): 45 TABLET, ORALLY DISINTEGRATING ORAL at 21:33

## 2023-09-20 NOTE — DISCHARGE NOTE PROVIDER - NSDCMRMEDTOKEN_GEN_ALL_CORE_FT
aspirin 81 mg oral tablet, chewable: 1 tab(s) orally once a day  ATORVASTATIN 40 MG TABLET: 1 each orally once a day (at bedtime)  MIRTAZAPINE 15 MG TABLET: 1 each orally once a day  sodium bicarbonate 650 mg oral tablet: 1 tab(s) orally 2 times a day  tamsulosin 0.4 mg oral capsule: 1 cap(s) orally once a day (at bedtime)   aspirin 81 mg oral tablet, chewable: 1 tab(s) orally once a day  ATORVASTATIN 40 MG TABLET: 1 each orally once a day (at bedtime)  metoprolol tartrate 37.5 mg oral tablet: 1 tab(s) orally 2 times a day  MIRTAZAPINE 15 MG TABLET: 1 each orally once a day  sodium bicarbonate 650 mg oral tablet: 1 tab(s) orally 2 times a day  tamsulosin 0.4 mg oral capsule: 1 cap(s) orally once a day (at bedtime)

## 2023-09-20 NOTE — DISCHARGE NOTE PROVIDER - CARE PROVIDER_API CALL
Neelam Delgado Y  Geriatric Medicine  [address] [place] [address]  Phone: [phone]  Fax: [phone]  Follow Up Time: 2 weeks    Popeye Navarro  Cardiology  [address] [place] [address]  Phone: [phone]  Fax: [phone]  Follow Up Time: 1 month

## 2023-09-20 NOTE — PROGRESS NOTE ADULT - ASSESSMENT
88 year old male with PMH pAF (not on anticoagulation), tachybrady syndrome, CAD, colon ca, DLD, CKD, s/p watchman 3/29/22, L nephrostomy s/p spent who presents after abnormal ekg changes at his cardiologist office (patient follows with Dr. Navarro).     #Chest pain, ischemic ekg  #CAD, paroxysmal a fib (not on anticoagulation), DLD, watchman device  -patient was by Dr. Navarro on 9/19 for preop clearance for ureteral stent exchange when he had an ekg which demonstrated ischemic changes and patient also complained of intermittent chest pain and was sent to the ED for evaluation  -in Dr. Navarro's office patient was loaded with aspirin and plavix  -trop negative x3  -EKG in ED non ischemic   -cardiology consult       low concern for acute ischemia       C/w Aspirin, statin       Recommend to increase Metoprolol to 37.5 mg BID    #Anemia  -per daughter Karla patient has chronic anemia  -monitor Hb    #CKD  -Cr 1.7, baseline 1.4-1.8  -continue to trend daily Cr    #L nephrostomy s/p stent  -c/w tamsulosin  -patient was planned for stent exchange on 10/4, patient has stent exchanges every 3 months and follows with urologist Dr. Josue      #Progress Note Handoff  Pending (specify):  n/a  Family discussion: updated  Disposition: anticipate home tomorrow. CM started assessment and pt did not know how to answer certain qts such as HHA in home-pt did not know what agency, days/hrs and then requested. called dgt Karla [phone] CM left voice mail

## 2023-09-20 NOTE — DISCHARGE NOTE PROVIDER - HOSPITAL COURSE
88 year old male with PMH pAF (not on anticoagulation), tachybrady syndrome, CAD, colon ca, DLD, CKD, s/p watchman 3/29/22, L nephrostomy s/p spent who presents after abnormal ekg changes at his cardiologist office (patient follows with Dr. Navarro). Patient is A&Ox1-2 (oriented to himself and that he is in a hospital however he cannot recall which hospital), patient is confused on details of today's events and history was taken from patient's daughter Karla.  Patient gets ureteral stent exchanges every 3 months and requires cardiac clearance prior to each procedure.  Today 9/19 the patient went to Dr. Navarro's office and per patient's daughter the ekg at the office showed abnormalities and the patient was sent to the ED for evaluation.  Per the patient's aid, the patient also told Dr. Navarro that he has been having intermittent chest pain over the past few weeks.  When asked if the patient has chest pain or if he has had chest pain over the past few weeks he says no.  Patient was loaded with aspirin 325 and plavix 300 at Dr. Navarro's office.  Patient was also given metoprolol 50. Currently the patient states he feels well and has no complaints, denies chest pain, sob, nausea, vomiting, abdominal pain.    In ED vitals: /81  Labs: Hb 8.6 (baseline around 9), Cr: 1.7 (baseline 1.4-1.8), trop negative x1, BNP 1572  Chest x-ray negative for acute cardiopulmonary disease      #Chest pain  #CAD, paroxysmal a fib (not on anticoagulation), DLD, watchman device  -patient was see by Dr. Navarro on 9/19 for preop clearance for ureteral stent exchange when he had an ekg which demonstrated ischemic changes and patient also complained of intermittent chest pain and was sent to the ED for evaluation  -in Dr. Navarro's office patient was loaded with aspirin and plavix  -trop negative x1  -EKG in ED : Line Sinus rhythm with Premature atrial complexes Rightward axis  -cardiology consult: On admission, patient with Afib,  bpm, and STD in the inferior leads and V5-V6. When his HR is better controlled in sinus rhythm, there are no ECG changes. No need for ischemic work-up. Please uptitrate Lopressor to 37.5 mg BID.     #Anemia  -per daughter Karla patient has chronic anemia  -monitor Hb    #CKD  -Cr 1.7, baseline 1.4-1.8  -continue to trend daily Cr    #L nephrostomy s/p stent  -c/w tamsulosin  -patient was planned for stent exchange on 10/4, patient has stent exchanges every 3 months and follows with urologist Dr. Josue           88 year old male with PMH pAF (not on anticoagulation), tachybrady syndrome, CAD, colon ca, DLD, CKD, s/p watchman 3/29/22, L nephrostomy s/p spent who presents after abnormal ekg changes at his cardiologist office (patient follows with Dr. Navarro). Patient is A&Ox1-2 (oriented to himself and that he is in a hospital however he cannot recall which hospital), patient is confused on details of today's events and history was taken from patient's daughter Karla.  Patient gets ureteral stent exchanges every 3 months and requires cardiac clearance prior to each procedure.  Today 9/19 the patient went to Dr. Navarro's office and per patient's daughter the ekg at the office showed abnormalities and the patient was sent to the ED for evaluation.  Per the patient's aid, the patient also told Dr. Navarro that he has been having intermittent chest pain over the past few weeks.  When asked if the patient has chest pain or if he has had chest pain over the past few weeks he says no.  Patient was loaded with aspirin 325 and plavix 300 at Dr. Navarro's office.  Patient was also given metoprolol 50. Currently the patient states he feels well and has no complaints, denies chest pain, sob, nausea, vomiting, abdominal pain.    In ED vitals: /81  Labs: Hb 8.6 (baseline around 9), Cr: 1.7 (baseline 1.4-1.8), trop negative x1, BNP 1572  Chest x-ray negative for acute cardiopulmonary disease      #Chest pain  #CAD, paroxysmal a fib (not on anticoagulation), DLD, watchman device  -patient was see by Dr. Navarro on 9/19 for preop clearance for ureteral stent exchange when he had an ekg which demonstrated ischemic changes and patient also complained of intermittent chest pain and was sent to the ED for evaluation  -in Dr. Navarro's office patient was loaded with aspirin and plavix  -trop negative x1  -EKG in ED : Line Sinus rhythm with Premature atrial complexes Rightward axis  -cardiology consult: On admission, patient with Afib,  bpm, and STD in the inferior leads and V5-V6. When his HR is better controlled in sinus rhythm, there are no ECG changes. No need for ischemic work-up. Please uptitrate Lopressor to 37.5 mg BID.     #Anemia  -per daughter Karla patient has chronic anemia  -monitor Hb - FU with PCP on DC    #CKD  -Cr 1.7, baseline 1.4-1.8  - FU w/PCP on DC    #L nephrostomy s/p stent  -c/w tamsulosin  -patient was planned for stent exchange on 10/4, patient has stent exchanges every 3 months and follows with urologist Dr. Josue           89 y/o M w/ PMHx pAF (s/p watchman 3/29/22, not on AC), tachy-italo syndrome, CAD, colon ca, DLD, CKD, L nephrostomy s/p spent who presents after abnormal EKG changes at his cardiologist office (Dr. Navarro). In the ED,  VS significant for /81. Labs: Hb 8.6 (baseline around 9), Cr: 1.7 (baseline 1.4-1.8), Trop negative x1, BNP 1572. CXR unremarkable. EKG showed NSR w/ PACs; Rightward axis. Cardiology consulted: STD in the inferior leads and V5-V6 when in AFib w/ RVR. When his HR is better controlled in sinus rhythm, there are no ECG changes. No need for ischemic work-up per cardio. Lopressor increased to 37.5 mg BID. No further events on tele. Pt is medically stable for d/c to home.

## 2023-09-20 NOTE — DISCHARGE NOTE PROVIDER - PROVIDER TOKENS
PROVIDER:[TOKEN:[[id number]],FOLLOWUP:[2 weeks]],PROVIDER:[TOKEN:[[id number]],FOLLOWUP:[1 month]]

## 2023-09-20 NOTE — DISCHARGE NOTE PROVIDER - NSDCCPCAREPLAN_GEN_ALL_CORE_FT
PRINCIPAL DISCHARGE DIAGNOSIS  Diagnosis: Chest pain  Assessment and Plan of Treatment: you were admitted for chest pain and abnormal ekg changes seen at dr washington office.  Chest pain can be caused by many different conditions which may or may not be dangerous. Causes include heartburn, lung infections, heart attack, blood clot in lungs, skin infections, strain or damage to muscle, cartilage, or bones, etc. In addition to a history and physical examination, an electrocardiogram (ECG) or other lab tests may have been performed to determine the cause of your chest pain. Follow up with your primary care provider or with a cardiologist as instructed.   SEEK IMMEDIATE MEDICAL CARE IF YOU HAVE ANY OF THE FOLLOWING SYMPTOMS: worsening chest pain, coughing up blood, unexplained back/neck/jaw pain, severe abdominal pain, dizziness or lightheadedness, fainting, shortness of breath, sweaty or clammy skin, vomiting, or racing heart beat. These symptoms may represent a serious problem that is an emergency. Do not wait to see if the symptoms will go away. Get medical help right away. Call 911 and do not drive yourself to the hospital.     PRINCIPAL DISCHARGE DIAGNOSIS  Diagnosis: Chest pain  Assessment and Plan of Treatment: You were admitted for chest pain and abnormal EKG changes seen on initial EKG. These EKG changes resolve when your HR is better controlled. Please make sure to follow-up w/ PCP and cardiology in 1-2 weeks.  SEEK IMMEDIATE MEDICAL CARE IF YOU HAVE ANY OF THE FOLLOWING SYMPTOMS: worsening chest pain, coughing up blood, unexplained back/neck/jaw pain, severe abdominal pain, dizziness or lightheadedness, fainting, shortness of breath, sweaty or clammy skin, vomiting, or racing heart beat. These symptoms may represent a serious problem that is an emergency. Do not wait to see if the symptoms will go away. Get medical help right away. Call 911 and do not drive yourself to the hospital.

## 2023-09-20 NOTE — PROGRESS NOTE ADULT - SUBJECTIVE AND OBJECTIVE BOX
DAVID TORREZ  88y  Male      Patient is a 88y old  Male who presents with a chief complaint of     INTERVAL HPI/OVERNIGHT EVENTS: no acute events overnight. patient ambulating this AM. no chest pain or sob.      REVIEW OF SYSTEMS:  CONSTITUTIONAL: No fever, weight loss, or fatigue  RESPIRATORY: No cough, wheezing, chills or hemoptysis; No shortness of breath  CARDIOVASCULAR: No chest pain, palpitations, dizziness, or leg swelling  GASTROINTESTINAL: No abdominal or epigastric pain. No nausea, vomiting, or hematemesis; No diarrhea or constipation. No melena or hematochezia.  GENITOURINARY: No dysuria, frequency, hematuria, or incontinence  NEUROLOGICAL: No headaches, memory loss, loss of strength, numbness, or tremors  SKIN: No itching, burning, rashes, or lesions   MUSCULOSKELETAL: No joint pain or swelling; No muscle, back, or extremity pain  PSYCHIATRIC: No depression, anxiety, mood swings, or difficulty sleeping  All other review of systems negative    T(C): 36.8 (09-20-23 @ 08:27), Max: 36.8 (09-20-23 @ 08:27)  HR: 60 (09-20-23 @ 08:27) (60 - 65)  BP: 152/73 (09-20-23 @ 08:27) (152/73 - 175/72)  RR: 18 (09-20-23 @ 08:27) (18 - 18)  SpO2: 99% (09-20-23 @ 08:27) (98% - 99%)  Wt(kg): --Vital Signs Last 24 Hrs  T(C): 36.8 (20 Sep 2023 08:27), Max: 36.8 (20 Sep 2023 08:27)  T(F): 98.3 (20 Sep 2023 08:27), Max: 98.3 (20 Sep 2023 08:27)  HR: 60 (20 Sep 2023 08:27) (60 - 65)  BP: 152/73 (20 Sep 2023 08:27) (152/73 - 175/72)  BP(mean): --  RR: 18 (20 Sep 2023 08:27) (18 - 18)  SpO2: 99% (20 Sep 2023 08:27) (98% - 99%)    Parameters below as of 20 Sep 2023 08:27  Patient On (Oxygen Delivery Method): room air            PHYSICAL EXAM:  GENERAL: elderly M, unkempt, frail  EYES: anicteric sclera, non injected conjunctiva, EOMI  ENT: hearing grossly intact, oropharynx clear  PSYCH: no agitation, baseline mentation  NERVOUS SYSTEM:  Alert & Oriented X3, Cn 2-12 grossly intact  PULMONARY: symmetrical chest rise, no accessory muscle use, no audible wheezing or coarse breath sounds appreciated  CARDIOVASCULAR: non tachycardic  EXTREMITIES: No clubbing, cyanosis, or edema  SKIN: No rashes or lesions    Consultant(s) Notes Reviewed:  [x ] YES  [ ] NO    Discussed with Consultants/Other Providers [ x] YES     LABS                          8.5    4.52  )-----------( 192      ( 20 Sep 2023 05:41 )             27.7     09-20    145  |  108  |  37<H>  ----------------------------<  85  4.7   |  27  |  1.8<H>    Ca    9.6      20 Sep 2023 05:41  Mg     2.1     09-20    TPro  6.5  /  Alb  4.2  /  TBili  0.4  /  DBili  x   /  AST  16  /  ALT  11  /  AlkPhos  105  09-20      Urinalysis Basic - ( 20 Sep 2023 05:41 )    Color: x / Appearance: x / SG: x / pH: x  Gluc: 85 mg/dL / Ketone: x  / Bili: x / Urobili: x   Blood: x / Protein: x / Nitrite: x   Leuk Esterase: x / RBC: x / WBC x   Sq Epi: x / Non Sq Epi: x / Bacteria: x    CARDIAC MARKERS ( 20 Sep 2023 05:41 )  x     / <0.01 ng/mL / x     / x     / x      CARDIAC MARKERS ( 20 Sep 2023 00:06 )  x     / <0.01 ng/mL / x     / x     / x      CARDIAC MARKERS ( 19 Sep 2023 17:11 )  x     / <0.01 ng/mL / x     / x     / x          RADIOLOGY & ADDITIONAL TESTS:  - no images    MEDICATIONS  (STANDING):  aspirin  chewable 81 milliGRAM(s) Oral daily  atorvastatin 40 milliGRAM(s) Oral at bedtime  chlorhexidine 4% Liquid 1 Application(s) Topical <User Schedule>  metoprolol tartrate 37.5 milliGRAM(s) Oral two times a day  mirtazapine 15 milliGRAM(s) Oral at bedtime  sodium bicarbonate 650 milliGRAM(s) Oral two times a day  sodium chloride 0.9%. 1000 milliLiter(s) (75 mL/Hr) IV Continuous <Continuous>  tamsulosin 0.4 milliGRAM(s) Oral at bedtime    MEDICATIONS  (PRN):

## 2023-09-21 ENCOUNTER — TRANSCRIPTION ENCOUNTER (OUTPATIENT)
Age: 88
End: 2023-09-21

## 2023-09-21 VITALS — HEART RATE: 88 BPM | SYSTOLIC BLOOD PRESSURE: 151 MMHG | DIASTOLIC BLOOD PRESSURE: 84 MMHG

## 2023-09-21 LAB
ALBUMIN SERPL ELPH-MCNC: 3.7 G/DL — SIGNIFICANT CHANGE UP (ref 3.5–5.2)
ALP SERPL-CCNC: 102 U/L — SIGNIFICANT CHANGE UP (ref 30–115)
ALT FLD-CCNC: 8 U/L — SIGNIFICANT CHANGE UP (ref 0–41)
ANION GAP SERPL CALC-SCNC: 13 MMOL/L — SIGNIFICANT CHANGE UP (ref 7–14)
AST SERPL-CCNC: 17 U/L — SIGNIFICANT CHANGE UP (ref 0–41)
BASOPHILS # BLD AUTO: 0.02 K/UL — SIGNIFICANT CHANGE UP (ref 0–0.2)
BASOPHILS NFR BLD AUTO: 0.4 % — SIGNIFICANT CHANGE UP (ref 0–1)
BILIRUB SERPL-MCNC: 0.5 MG/DL — SIGNIFICANT CHANGE UP (ref 0.2–1.2)
BUN SERPL-MCNC: 38 MG/DL — HIGH (ref 10–20)
CALCIUM SERPL-MCNC: 8.8 MG/DL — SIGNIFICANT CHANGE UP (ref 8.4–10.5)
CHLORIDE SERPL-SCNC: 107 MMOL/L — SIGNIFICANT CHANGE UP (ref 98–110)
CO2 SERPL-SCNC: 22 MMOL/L — SIGNIFICANT CHANGE UP (ref 17–32)
CREAT SERPL-MCNC: 1.6 MG/DL — HIGH (ref 0.7–1.5)
EGFR: 41 ML/MIN/1.73M2 — LOW
EOSINOPHIL # BLD AUTO: 0.04 K/UL — SIGNIFICANT CHANGE UP (ref 0–0.7)
EOSINOPHIL NFR BLD AUTO: 0.8 % — SIGNIFICANT CHANGE UP (ref 0–8)
GLUCOSE SERPL-MCNC: 78 MG/DL — SIGNIFICANT CHANGE UP (ref 70–99)
HCT VFR BLD CALC: 27 % — LOW (ref 42–52)
HGB BLD-MCNC: 8.3 G/DL — LOW (ref 14–18)
IMM GRANULOCYTES NFR BLD AUTO: 0.2 % — SIGNIFICANT CHANGE UP (ref 0.1–0.3)
LYMPHOCYTES # BLD AUTO: 0.91 K/UL — LOW (ref 1.2–3.4)
LYMPHOCYTES # BLD AUTO: 17.4 % — LOW (ref 20.5–51.1)
MAGNESIUM SERPL-MCNC: 2.2 MG/DL — SIGNIFICANT CHANGE UP (ref 1.8–2.4)
MCHC RBC-ENTMCNC: 25.2 PG — LOW (ref 27–31)
MCHC RBC-ENTMCNC: 30.7 G/DL — LOW (ref 32–37)
MCV RBC AUTO: 81.8 FL — SIGNIFICANT CHANGE UP (ref 80–94)
MONOCYTES # BLD AUTO: 0.38 K/UL — SIGNIFICANT CHANGE UP (ref 0.1–0.6)
MONOCYTES NFR BLD AUTO: 7.3 % — SIGNIFICANT CHANGE UP (ref 1.7–9.3)
NEUTROPHILS # BLD AUTO: 3.86 K/UL — SIGNIFICANT CHANGE UP (ref 1.4–6.5)
NEUTROPHILS NFR BLD AUTO: 73.9 % — SIGNIFICANT CHANGE UP (ref 42.2–75.2)
NRBC # BLD: 0 /100 WBCS — SIGNIFICANT CHANGE UP (ref 0–0)
PLATELET # BLD AUTO: 165 K/UL — SIGNIFICANT CHANGE UP (ref 130–400)
PMV BLD: 8.8 FL — SIGNIFICANT CHANGE UP (ref 7.4–10.4)
POTASSIUM SERPL-MCNC: 4 MMOL/L — SIGNIFICANT CHANGE UP (ref 3.5–5)
POTASSIUM SERPL-SCNC: 4 MMOL/L — SIGNIFICANT CHANGE UP (ref 3.5–5)
PROT SERPL-MCNC: 6.2 G/DL — SIGNIFICANT CHANGE UP (ref 6–8)
RBC # BLD: 3.3 M/UL — LOW (ref 4.7–6.1)
RBC # FLD: 16.4 % — HIGH (ref 11.5–14.5)
SODIUM SERPL-SCNC: 142 MMOL/L — SIGNIFICANT CHANGE UP (ref 135–146)
WBC # BLD: 5.22 K/UL — SIGNIFICANT CHANGE UP (ref 4.8–10.8)
WBC # FLD AUTO: 5.22 K/UL — SIGNIFICANT CHANGE UP (ref 4.8–10.8)

## 2023-09-21 PROCEDURE — 99239 HOSP IP/OBS DSCHRG MGMT >30: CPT

## 2023-09-21 PROCEDURE — 93010 ELECTROCARDIOGRAM REPORT: CPT

## 2023-09-21 RX ORDER — METOPROLOL TARTRATE 50 MG
1 TABLET ORAL
Qty: 60 | Refills: 0
Start: 2023-09-21 | End: 2023-10-20

## 2023-09-21 RX ADMIN — Medication 37.5 MILLIGRAM(S): at 05:20

## 2023-09-21 RX ADMIN — Medication 81 MILLIGRAM(S): at 12:13

## 2023-09-21 RX ADMIN — Medication 650 MILLIGRAM(S): at 05:20

## 2023-09-21 NOTE — DISCHARGE NOTE NURSING/CASE MANAGEMENT/SOCIAL WORK - PATIENT PORTAL LINK FT
You can access the FollowMyHealth Patient Portal offered by [facility] by registering at the following website: [email or web address]. By joining [employer]’s FollowMyHealth portal, you will also be able to view your health information using other applications (apps) compatible with our system.

## 2023-09-21 NOTE — PATIENT PROFILE ADULT - STATED REASON FOR ADMISSION
Patient arrives to the emergency department via EMS with a chief complaint of dizziness, nausea and vomiting. Patient reports he was walking to the bathroom this morning and he felt dizzy, as he was sitting down on the toilet he \"fell back into a seating position. \" EMS reports that patient vomited one time in transport. Patient vomited on arrival to the room. abnormal ekg changes

## 2023-09-21 NOTE — PATIENT PROFILE ADULT - FUNCTIONAL ASSESSMENT - BASIC MOBILITY 4.
Addended by: ABAD GONZALEZ on: 2/11/2021 03:53 PM     Modules accepted: Orders    
4 = No assist / stand by assistance

## 2023-09-21 NOTE — PROGRESS NOTE ADULT - SUBJECTIVE AND OBJECTIVE BOX
LOREDAVID  88y Male    CHIEF COMPLAINT:    Patient is a 88y old  Male who presents with a chief complaint of     INTERVAL HPI/OVERNIGHT EVENTS:    Patient seen and examined.    ROS: All other systems are negative.    Vital Signs:    T(F): 96.7 (23 @ 05:30), Max: 98.9 (23 @ 23:52)  HR: 63 (23 @ 06:41) (56 - 65)  BP: 137/73 (23 @ 06:41) (137/73 - 177/89)  RR: 18 (23 @ 05:30) (18 - 18)  SpO2: 99% (23 @ 05:29) (90% - 99%)  I&O's Summary    20 Sep 2023 07:01  -  21 Sep 2023 07:00  --------------------------------------------------------  IN: 568 mL / OUT: 250 mL / NET: 318 mL      Daily     Daily Weight in k.9 (21 Sep 2023 05:30)  CAPILLARY BLOOD GLUCOSE          PHYSICAL EXAM:    GENERAL:  NAD  SKIN: No rashes or lesions  HENT: Atraumatic. Normocephalic. PERRL. Moist membranes.  NECK: Supple, No JVD. No lymphadenopathy.  PULMONARY: CTA B/L. No wheezing. No rales  CVS: Normal S1, S2. Rate and Rhythm are regular. No murmurs.  ABDOMEN/GI: Soft, Nontender, Nondistended; BS present  EXTREMITIES: Peripheral pulses intact. No edema B/L LE.  NEUROLOGIC:  No motor or sensory deficit.  PSYCH: Alert & oriented x 3    Consultant(s) Notes Reviewed:  [x ] YES  [ ] NO  Care Discussed with Consultants/Other Providers [ x] YES  [ ] NO    EKG reviewed  Telemetry reviewed    LABS:                        8.5    4.52  )-----------( 192      ( 20 Sep 2023 05:41 )             27.7         145  |  108  |  37<H>  ----------------------------<  85  4.7   |  27  |  1.8<H>    Ca    9.6      20 Sep 2023 05:41  Mg     2.1         TPro  6.5  /  Alb  4.2  /  TBili  0.4  /  DBili  x   /  AST  16  /  ALT  11  /  AlkPhos  105          Trop <0.01, CKMB --, CK --, 23 @ 05:41  Trop <0.01, CKMB --, CK --, 23 @ 00:06  Trop <0.01, CKMB --, CK --, 23 @ 17:11        RADIOLOGY & ADDITIONAL TESTS:      Imaging or report Personally Reviewed:  [ ] YES  [ ] NO    Medications:  Standing  aspirin  chewable 81 milliGRAM(s) Oral daily  atorvastatin 40 milliGRAM(s) Oral at bedtime  chlorhexidine 4% Liquid 1 Application(s) Topical <User Schedule>  melatonin 5 milliGRAM(s) Oral at bedtime  metoprolol tartrate 37.5 milliGRAM(s) Oral two times a day  mirtazapine 15 milliGRAM(s) Oral at bedtime  sodium bicarbonate 650 milliGRAM(s) Oral two times a day  sodium chloride 0.9%. 1000 milliLiter(s) IV Continuous <Continuous>  tamsulosin 0.4 milliGRAM(s) Oral at bedtime    PRN Meds  acetaminophen     Tablet .. 650 milliGRAM(s) Oral every 6 hours PRN      Case discussed with resident    Care discussed with pt/family           LOREDAVID  88y Male    CHIEF COMPLAINT:    Patient is a 88y old  Male who presents with a chief complaint of     INTERVAL HPI/OVERNIGHT EVENTS:    Patient seen and examined. Feels good. Denies any cp. No sob. No palpitations    ROS: All other systems are negative.    Vital Signs:    T(F): 96.7 (23 @ 05:30), Max: 98.9 (23 @ 23:52)  HR: 63 (23 @ 06:41) (56 - 65)  BP: 137/73 (23 @ 06:41) (137/73 - 177/89)  RR: 18 (23 @ 05:30) (18 - 18)  SpO2: 99% (23 @ 05:29) (90% - 99%)  I&O's Summary    20 Sep 2023 07:01  -  21 Sep 2023 07:00  --------------------------------------------------------  IN: 568 mL / OUT: 250 mL / NET: 318 mL      Daily     Daily Weight in k.9 (21 Sep 2023 05:30)  CAPILLARY BLOOD GLUCOSE          PHYSICAL EXAM:    GENERAL:  NAD  SKIN: No rashes or lesions  HENT: Atraumatic. Normocephalic. PERRL. Moist membranes.  NECK: Supple, No JVD. No lymphadenopathy.  PULMONARY: CTA B/L. No wheezing. No rales  CVS: Normal S1, S2. Rate and Rhythm are regular. No murmurs.  ABDOMEN/GI: Soft, Nontender, Nondistended; BS present  EXTREMITIES: Peripheral pulses intact. No edema B/L LE.  NEUROLOGIC:  No motor or sensory deficit.  PSYCH: Alert & oriented x 1    Consultant(s) Notes Reviewed:  [x ] YES  [ ] NO  Care Discussed with Consultants/Other Providers [ x] YES  [ ] NO    EKG reviewed  Telemetry reviewed    LABS:                        8.5    4.52  )-----------( 192      ( 20 Sep 2023 05:41 )             27.7         145  |  108  |  37<H>  ----------------------------<  85    Creatinine Trend: 1.6<--, 1.8<--, 1.7<--  4.7   |  27  |  1.8<H>    Ca    9.6      20 Sep 2023 05:41  Mg     2.1         TPro  6.5  /  Alb  4.2  /  TBili  0.4  /  DBili  x   /  AST  16  /  ALT  11  /  AlkPhos  105          Trop <0.01, CKMB --, CK --, 23 @ 05:41  Trop <0.01, CKMB --, CK --, 23 @ 00:06  Trop <0.01, CKMB --, CK --, 23 @ 17:11        RADIOLOGY & ADDITIONAL TESTS:    < from: Xray Chest 1 View- PORTABLE-Urgent (23 @ 16:30) >  Impression:    No radiographic evidence of acute cardiopulmonary disease.    < end of copied text >    Imaging or report Personally Reviewed:  [x ] YES  [ ] NO    Medications:  Standing  aspirin  chewable 81 milliGRAM(s) Oral daily  atorvastatin 40 milliGRAM(s) Oral at bedtime  chlorhexidine 4% Liquid 1 Application(s) Topical <User Schedule>  melatonin 5 milliGRAM(s) Oral at bedtime  metoprolol tartrate 37.5 milliGRAM(s) Oral two times a day  mirtazapine 15 milliGRAM(s) Oral at bedtime  sodium bicarbonate 650 milliGRAM(s) Oral two times a day  sodium chloride 0.9%. 1000 milliLiter(s) IV Continuous <Continuous>  tamsulosin 0.4 milliGRAM(s) Oral at bedtime    PRN Meds  acetaminophen     Tablet .. 650 milliGRAM(s) Oral every 6 hours PRN      Case discussed with resident    Care discussed with pt/family

## 2023-09-21 NOTE — PROGRESS NOTE ADULT - ASSESSMENT
88 year old male with PMH pAF (not on anticoagulation), tachybrady syndrome, CAD, colon ca, DLD, CKD, s/p watchman 3/29/22, L nephrostomy s/p spent who presents after abnormal ekg changes at his cardiologist office (patient follows with Dr. Navarro.       CP  PAF not on A/C. S/P Watchman  CAD  H/O Colon Ca  CKD-3               PLAN:    ·	Tele reviewed.   ·	EKG reviewed. NSR 60/min. RAD. PAC'S  ·	CE x 3 are negative.  88 year old male with PMH pAF (not on anticoagulation), tachybrady syndrome, CAD, colon ca, DLD, CKD, s/p watchman 3/29/22, L nephrostomy s/p spent who presents after abnormal ekg changes at his cardiologist office (patient follows with Dr. Navarro.       CP likely musculoskeletal   PAF not on A/C. S/P Watchman  CAD  H/O Colon Ca  CKD-3  Dementia               PLAN:    ·	Tele reviewed. No events.   ·	CE x 3 are negative.   ·	EKG reviewed. NSR 60/min. RAD. PAC'S  ·	CE x 3 are negative.   ·	Cardiology eval noted. As per cardiology note pt was in rapid A-Fib in the ER. Back in NSR now. Cardiology recommended to increase Metoprolol to 37.5 mg po q12h. No further cardiac w/u  ·	Will repeat EKG  ·	Cont his home meds.   ·	Will d/c home today  ·	Called his grand-daughter and left a message.     * Med rec reviewed. Time spent 36 minutes.

## 2023-10-04 DIAGNOSIS — I49.5 SICK SINUS SYNDROME: ICD-10-CM

## 2023-10-04 DIAGNOSIS — R07.89 OTHER CHEST PAIN: ICD-10-CM

## 2023-10-04 DIAGNOSIS — I12.9 HYPERTENSIVE CHRONIC KIDNEY DISEASE WITH STAGE 1 THROUGH STAGE 4 CHRONIC KIDNEY DISEASE, OR UNSPECIFIED CHRONIC KIDNEY DISEASE: ICD-10-CM

## 2023-10-04 DIAGNOSIS — I25.10 ATHEROSCLEROTIC HEART DISEASE OF NATIVE CORONARY ARTERY WITHOUT ANGINA PECTORIS: ICD-10-CM

## 2023-10-04 DIAGNOSIS — Z66 DO NOT RESUSCITATE: ICD-10-CM

## 2023-10-04 DIAGNOSIS — N17.9 ACUTE KIDNEY FAILURE, UNSPECIFIED: ICD-10-CM

## 2023-10-04 DIAGNOSIS — I48.0 PAROXYSMAL ATRIAL FIBRILLATION: ICD-10-CM

## 2023-10-04 DIAGNOSIS — D64.89 OTHER SPECIFIED ANEMIAS: ICD-10-CM

## 2023-10-04 DIAGNOSIS — Z90.49 ACQUIRED ABSENCE OF OTHER SPECIFIED PARTS OF DIGESTIVE TRACT: ICD-10-CM

## 2023-10-04 DIAGNOSIS — E87.5 HYPERKALEMIA: ICD-10-CM

## 2023-10-04 DIAGNOSIS — F03.90 UNSPECIFIED DEMENTIA WITHOUT BEHAVIORAL DISTURBANCE: ICD-10-CM

## 2023-10-04 DIAGNOSIS — Z98.890 OTHER SPECIFIED POSTPROCEDURAL STATES: ICD-10-CM

## 2023-10-04 DIAGNOSIS — Z79.82 LONG TERM (CURRENT) USE OF ASPIRIN: ICD-10-CM

## 2023-10-04 DIAGNOSIS — Z85.038 PERSONAL HISTORY OF OTHER MALIGNANT NEOPLASM OF LARGE INTESTINE: ICD-10-CM

## 2023-10-04 DIAGNOSIS — N18.30 CHRONIC KIDNEY DISEASE, STAGE 3 UNSPECIFIED: ICD-10-CM

## 2023-10-04 DIAGNOSIS — Z95.5 PRESENCE OF CORONARY ANGIOPLASTY IMPLANT AND GRAFT: ICD-10-CM

## 2023-10-04 DIAGNOSIS — Z96.0 PRESENCE OF UROGENITAL IMPLANTS: ICD-10-CM

## 2024-05-02 NOTE — PATIENT PROFILE ADULT - DATE OF LAST VACCINATION
Detail Level: Generalized
Detail Level: Detailed
Detail Level: Simple
Detail Level: Zone
29-Mar-2021

## 2024-06-11 ENCOUNTER — APPOINTMENT (OUTPATIENT)
Dept: VASCULAR SURGERY | Facility: CLINIC | Age: 89
End: 2024-06-11
Payer: MEDICARE

## 2024-06-11 VITALS
WEIGHT: 124 LBS | HEIGHT: 66 IN | DIASTOLIC BLOOD PRESSURE: 82 MMHG | SYSTOLIC BLOOD PRESSURE: 129 MMHG | BODY MASS INDEX: 19.93 KG/M2

## 2024-06-11 DIAGNOSIS — I71.40 ABDOMINAL AORTIC ANEURYSM, WITHOUT RUPTURE, UNSPECIFIED: ICD-10-CM

## 2024-06-11 PROCEDURE — 99203 OFFICE O/P NEW LOW 30 MIN: CPT

## 2024-06-11 PROCEDURE — 93978 VASCULAR STUDY: CPT

## 2024-06-11 NOTE — DATA REVIEWED
[FreeTextEntry1] : I performed an arterial duplex which was medically necessary to evaluate for AAA. It showed AAA measuring 4.8 cm.

## 2024-06-11 NOTE — CONSULT LETTER
[Dear  ___] : Dear  [unfilled], [Consult Letter:] : I had the pleasure of evaluating your patient, [unfilled]. [Please see my note below.] : Please see my note below. [FreeTextEntry2] : Dear Dr. Popeye Brand,

## 2024-06-11 NOTE — ASSESSMENT
[FreeTextEntry1] : 88 y/o gentleman with h/o AAA.  Arterial duplex today showed AAA measuring 4.8 cm.  Follow up in six months for repeat aortic duplex.  I, Dr. Edward Egan, personally performed the evaluation and management (E/M) services for this new patient. That E/M includes conducting the clinically appropriate initial history &/or exam, assessing all conditions, and establishing the plan of care. Today, my DENNIS, Laura Olivares PA-C, was here to observe my evaluation and management service for this patient & follow plan of care established by me going forward.

## 2024-06-11 NOTE — HISTORY OF PRESENT ILLNESS
Patient right hand dominant. Chart reviewed, ROSHNI Rosen cleared patient for OT evaluation. Patient received supine in bed, NAD, +IV heplock, +SCDs, +tele
[FreeTextEntry1] : 88 y/o gentleman with h/o AAA, presents for evaluation, denies any abdominal or back pain.

## 2024-12-10 ENCOUNTER — APPOINTMENT (OUTPATIENT)
Dept: VASCULAR SURGERY | Facility: CLINIC | Age: 88
End: 2024-12-10

## 2025-06-26 NOTE — DISCHARGE NOTE NURSING/CASE MANAGEMENT/SOCIAL WORK - NSDPLANG ASIS_GEN_ALL_CORE
Interval History: Patient seen and examined at the bedside, reports doing Ok, pain is in control.     Review of Systems   All other systems reviewed and are negative.    Objective:     Vital Signs (Most Recent):  Temp: 98 °F (36.7 °C) (06/26/25 1152)  Pulse: 87 (06/26/25 1152)  Resp: 16 (06/26/25 0421)  BP: (!) 165/63 (06/26/25 1152)  SpO2: 100 % (06/26/25 1152) Vital Signs (24h Range):  Temp:  [97.3 °F (36.3 °C)-98.8 °F (37.1 °C)] 98 °F (36.7 °C)  Pulse:  [] 87  Resp:  [16-18] 16  SpO2:  [95 %-100 %] 100 %  BP: (128-165)/(61-69) 165/63     Weight: 90.7 kg (200 lb)  Body mass index is 32.28 kg/m².    Intake/Output Summary (Last 24 hours) at 6/26/2025 1350  Last data filed at 6/26/2025 0630  Gross per 24 hour   Intake 100 ml   Output 101 ml   Net -1 ml         Physical Exam  Vitals and nursing note reviewed.   Constitutional:       Appearance: She is ill-appearing.   HENT:      Head: Normocephalic and atraumatic.      Mouth/Throat:      Mouth: Mucous membranes are moist.   Eyes:      Extraocular Movements: Extraocular movements intact.      Pupils: Pupils are equal, round, and reactive to light.   Cardiovascular:      Rate and Rhythm: Normal rate and regular rhythm.   Pulmonary:      Effort: Pulmonary effort is normal.      Breath sounds: Normal breath sounds.   Abdominal:      General: There is distension.      Tenderness: There is abdominal tenderness.      Comments: Abdominal dressing in place. Colostomy present. NG present.      Musculoskeletal:         General: Normal range of motion.      Cervical back: Normal range of motion and neck supple.   Neurological:      General: No focal deficit present.      Mental Status: She is alert and oriented to person, place, and time. Mental status is at baseline.   Psychiatric:         Mood and Affect: Mood normal.         Behavior: Behavior normal.               Significant Labs: All pertinent labs within the past 24 hours have been reviewed.    Significant Imaging: I  have reviewed all pertinent imaging results/findings within the past 24 hours.   No